# Patient Record
Sex: MALE | Race: WHITE
[De-identification: names, ages, dates, MRNs, and addresses within clinical notes are randomized per-mention and may not be internally consistent; named-entity substitution may affect disease eponyms.]

---

## 2017-03-13 ENCOUNTER — HOSPITAL ENCOUNTER (OUTPATIENT)
Dept: HOSPITAL 23 - SGUS | Age: 62
Discharge: HOME | End: 2017-03-13
Payer: COMMERCIAL

## 2017-03-13 DIAGNOSIS — E04.2: ICD-10-CM

## 2017-03-13 DIAGNOSIS — E04.1: Primary | ICD-10-CM

## 2017-08-24 PROCEDURE — 71035: CPT | Performed by: RADIOLOGY

## 2018-01-18 ENCOUNTER — OFFICE VISIT CONVERTED (OUTPATIENT)
Dept: FAMILY MEDICINE CLINIC | Facility: CLINIC | Age: 63
End: 2018-01-18
Attending: NURSE PRACTITIONER

## 2018-01-18 ENCOUNTER — CONVERSION ENCOUNTER (OUTPATIENT)
Dept: FAMILY MEDICINE CLINIC | Facility: CLINIC | Age: 63
End: 2018-01-18

## 2018-02-19 ENCOUNTER — OFFICE VISIT CONVERTED (OUTPATIENT)
Dept: FAMILY MEDICINE CLINIC | Facility: CLINIC | Age: 63
End: 2018-02-19
Attending: FAMILY MEDICINE

## 2018-02-19 ENCOUNTER — CONVERSION ENCOUNTER (OUTPATIENT)
Dept: FAMILY MEDICINE CLINIC | Facility: CLINIC | Age: 63
End: 2018-02-19

## 2018-09-06 ENCOUNTER — OFFICE VISIT CONVERTED (OUTPATIENT)
Dept: FAMILY MEDICINE CLINIC | Facility: CLINIC | Age: 63
End: 2018-09-06
Attending: FAMILY MEDICINE

## 2018-09-06 ENCOUNTER — CONVERSION ENCOUNTER (OUTPATIENT)
Dept: FAMILY MEDICINE CLINIC | Facility: CLINIC | Age: 63
End: 2018-09-06

## 2019-04-23 ENCOUNTER — CONVERSION ENCOUNTER (OUTPATIENT)
Dept: FAMILY MEDICINE CLINIC | Facility: CLINIC | Age: 64
End: 2019-04-23

## 2019-04-23 ENCOUNTER — OFFICE VISIT CONVERTED (OUTPATIENT)
Dept: FAMILY MEDICINE CLINIC | Facility: CLINIC | Age: 64
End: 2019-04-23
Attending: FAMILY MEDICINE

## 2019-04-23 ENCOUNTER — HOSPITAL ENCOUNTER (OUTPATIENT)
Dept: FAMILY MEDICINE CLINIC | Facility: CLINIC | Age: 64
Discharge: HOME | End: 2019-04-23

## 2019-04-23 LAB
ALBUMIN SERPL-MCNC: 4.3 G/DL (ref 3.5–5)
ALBUMIN/GLOB SERPL: 1.4 {RATIO} (ref 1.4–2.6)
ALP SERPL-CCNC: 84 U/L (ref 56–155)
ALT SERPL-CCNC: 23 U/L (ref 10–40)
ANION GAP SERPL CALC-SCNC: 16 MMOL/L (ref 8–19)
AST SERPL-CCNC: 24 U/L (ref 15–50)
BASOPHILS # BLD AUTO: 0.09 10*3/UL (ref 0–0.2)
BASOPHILS NFR BLD AUTO: 0.9 % (ref 0–3)
BILIRUB SERPL-MCNC: 0.32 MG/DL (ref 0.2–1.3)
BUN SERPL-MCNC: 12 MG/DL (ref 5–25)
BUN/CREAT SERPL: 13 {RATIO} (ref 6–20)
CALCIUM SERPL-MCNC: 9.3 MG/DL (ref 8.7–10.4)
CHLORIDE SERPL-SCNC: 104 MMOL/L (ref 99–111)
CHOLEST SERPL-MCNC: 139 MG/DL (ref 107–200)
CHOLEST/HDLC SERPL: 4.5 {RATIO} (ref 3–6)
CONV ABS IMM GRAN: 0.04 10*3/UL (ref 0–0.2)
CONV CO2: 24 MMOL/L (ref 22–32)
CONV IMMATURE GRAN: 0.4 % (ref 0–1.8)
CONV TOTAL PROTEIN: 7.4 G/DL (ref 6.3–8.2)
CREAT UR-MCNC: 0.91 MG/DL (ref 0.7–1.2)
DEPRECATED RDW RBC AUTO: 48.2 FL (ref 35.1–43.9)
EOSINOPHIL # BLD AUTO: 0.36 10*3/UL (ref 0–0.7)
EOSINOPHIL # BLD AUTO: 3.8 % (ref 0–7)
ERYTHROCYTE [DISTWIDTH] IN BLOOD BY AUTOMATED COUNT: 13.1 % (ref 11.6–14.4)
GFR SERPLBLD BASED ON 1.73 SQ M-ARVRAT: >60 ML/MIN/{1.73_M2}
GLOBULIN UR ELPH-MCNC: 3.1 G/DL (ref 2–3.5)
GLUCOSE SERPL-MCNC: 93 MG/DL (ref 70–99)
HBA1C MFR BLD: 17 G/DL (ref 14–18)
HCT VFR BLD AUTO: 52.2 % (ref 42–52)
HDLC SERPL-MCNC: 31 MG/DL (ref 40–60)
LDLC SERPL CALC-MCNC: 60 MG/DL (ref 70–100)
LYMPHOCYTES # BLD AUTO: 3.08 10*3/UL (ref 1–5)
MCH RBC QN AUTO: 32.3 PG (ref 27–31)
MCHC RBC AUTO-ENTMCNC: 32.6 G/DL (ref 33–37)
MCV RBC AUTO: 99.1 FL (ref 80–96)
MONOCYTES # BLD AUTO: 0.88 10*3/UL (ref 0.2–1.2)
MONOCYTES NFR BLD AUTO: 9.3 % (ref 3–10)
NEUTROPHILS # BLD AUTO: 5.03 10*3/UL (ref 2–8)
NEUTROPHILS NFR BLD AUTO: 53.1 % (ref 30–85)
NRBC CBCN: 0 % (ref 0–0.7)
OSMOLALITY SERPL CALC.SUM OF ELEC: 289 MOSM/KG (ref 273–304)
PLATELET # BLD AUTO: 309 10*3/UL (ref 130–400)
PMV BLD AUTO: 10.4 FL (ref 9.4–12.4)
POTASSIUM SERPL-SCNC: 4.2 MMOL/L (ref 3.5–5.3)
RBC # BLD AUTO: 5.27 10*6/UL (ref 4.7–6.1)
SODIUM SERPL-SCNC: 140 MMOL/L (ref 135–147)
TRIGL SERPL-MCNC: 241 MG/DL (ref 40–150)
VARIANT LYMPHS NFR BLD MANUAL: 32.5 % (ref 20–45)
VLDLC SERPL-MCNC: 48 MG/DL (ref 5–37)
WBC # BLD AUTO: 9.48 10*3/UL (ref 4.8–10.8)

## 2019-06-19 ENCOUNTER — HOSPITAL ENCOUNTER (OUTPATIENT)
Dept: OTHER | Facility: HOSPITAL | Age: 64
Discharge: HOME | End: 2019-06-19

## 2019-06-19 LAB — TSH SERPL-ACNC: 0.6 M[IU]/L (ref 0.27–4.2)

## 2019-12-31 ENCOUNTER — OFFICE VISIT CONVERTED (OUTPATIENT)
Dept: FAMILY MEDICINE CLINIC | Facility: CLINIC | Age: 64
End: 2019-12-31
Attending: FAMILY MEDICINE

## 2019-12-31 ENCOUNTER — CONVERSION ENCOUNTER (OUTPATIENT)
Dept: FAMILY MEDICINE CLINIC | Facility: CLINIC | Age: 64
End: 2019-12-31

## 2020-01-02 ENCOUNTER — HOSPITAL ENCOUNTER (OUTPATIENT)
Dept: OTHER | Facility: HOSPITAL | Age: 65
Discharge: HOME | End: 2020-01-02
Attending: FAMILY MEDICINE

## 2020-01-02 LAB
ALBUMIN SERPL-MCNC: 4.4 G/DL (ref 3.5–5)
ALBUMIN/GLOB SERPL: 1.5 {RATIO} (ref 1.4–2.6)
ALP SERPL-CCNC: 85 U/L (ref 56–155)
ALT SERPL-CCNC: 24 U/L (ref 10–40)
ANION GAP SERPL CALC-SCNC: 23 MMOL/L (ref 8–19)
AST SERPL-CCNC: 24 U/L (ref 15–50)
BASOPHILS # BLD AUTO: 0.11 10*3/UL (ref 0–0.2)
BASOPHILS NFR BLD AUTO: 1 % (ref 0–3)
BILIRUB SERPL-MCNC: 0.46 MG/DL (ref 0.2–1.3)
BUN SERPL-MCNC: 16 MG/DL (ref 5–25)
BUN/CREAT SERPL: 16 {RATIO} (ref 6–20)
CALCIUM SERPL-MCNC: 10 MG/DL (ref 8.7–10.4)
CHLORIDE SERPL-SCNC: 101 MMOL/L (ref 99–111)
CHOLEST SERPL-MCNC: 142 MG/DL (ref 107–200)
CHOLEST/HDLC SERPL: 4.9 {RATIO} (ref 3–6)
CONV ABS IMM GRAN: 0.05 10*3/UL (ref 0–0.2)
CONV CO2: 20 MMOL/L (ref 22–32)
CONV IMMATURE GRAN: 0.4 % (ref 0–1.8)
CONV TOTAL PROTEIN: 7.4 G/DL (ref 6.3–8.2)
CREAT UR-MCNC: 1.03 MG/DL (ref 0.7–1.2)
DEPRECATED RDW RBC AUTO: 46.5 FL (ref 35.1–43.9)
EOSINOPHIL # BLD AUTO: 0.37 10*3/UL (ref 0–0.7)
EOSINOPHIL # BLD AUTO: 3.3 % (ref 0–7)
ERYTHROCYTE [DISTWIDTH] IN BLOOD BY AUTOMATED COUNT: 13 % (ref 11.6–14.4)
GFR SERPLBLD BASED ON 1.73 SQ M-ARVRAT: >60 ML/MIN/{1.73_M2}
GLOBULIN UR ELPH-MCNC: 3 G/DL (ref 2–3.5)
GLUCOSE SERPL-MCNC: 87 MG/DL (ref 70–99)
HCT VFR BLD AUTO: 51.8 % (ref 42–52)
HDLC SERPL-MCNC: 29 MG/DL (ref 40–60)
HGB BLD-MCNC: 17.4 G/DL (ref 14–18)
LDLC SERPL CALC-MCNC: 69 MG/DL (ref 70–100)
LYMPHOCYTES # BLD AUTO: 3.23 10*3/UL (ref 1–5)
LYMPHOCYTES NFR BLD AUTO: 28.4 % (ref 20–45)
MCH RBC QN AUTO: 32.9 PG (ref 27–31)
MCHC RBC AUTO-ENTMCNC: 33.6 G/DL (ref 33–37)
MCV RBC AUTO: 97.9 FL (ref 80–96)
MONOCYTES # BLD AUTO: 1.19 10*3/UL (ref 0.2–1.2)
MONOCYTES NFR BLD AUTO: 10.5 % (ref 3–10)
NEUTROPHILS # BLD AUTO: 6.41 10*3/UL (ref 2–8)
NEUTROPHILS NFR BLD AUTO: 56.4 % (ref 30–85)
NRBC CBCN: 0 % (ref 0–0.7)
OSMOLALITY SERPL CALC.SUM OF ELEC: 289 MOSM/KG (ref 273–304)
PLATELET # BLD AUTO: 310 10*3/UL (ref 130–400)
PMV BLD AUTO: 10.4 FL (ref 9.4–12.4)
POTASSIUM SERPL-SCNC: 4.8 MMOL/L (ref 3.5–5.3)
PSA SERPL-MCNC: 1.46 NG/ML (ref 0–4)
RBC # BLD AUTO: 5.29 10*6/UL (ref 4.7–6.1)
SODIUM SERPL-SCNC: 139 MMOL/L (ref 135–147)
T4 FREE SERPL-MCNC: 1.3 NG/DL (ref 0.9–1.8)
TRIGL SERPL-MCNC: 220 MG/DL (ref 40–150)
TSH SERPL-ACNC: 0.82 M[IU]/L (ref 0.27–4.2)
VLDLC SERPL-MCNC: 44 MG/DL (ref 5–37)
WBC # BLD AUTO: 11.36 10*3/UL (ref 4.8–10.8)

## 2020-08-26 ENCOUNTER — HOSPITAL ENCOUNTER (OUTPATIENT)
Dept: FAMILY MEDICINE CLINIC | Facility: CLINIC | Age: 65
Discharge: HOME | End: 2020-08-26
Attending: FAMILY MEDICINE

## 2020-08-26 ENCOUNTER — OFFICE VISIT CONVERTED (OUTPATIENT)
Dept: FAMILY MEDICINE CLINIC | Facility: CLINIC | Age: 65
End: 2020-08-26
Attending: FAMILY MEDICINE

## 2020-08-26 ENCOUNTER — CONVERSION ENCOUNTER (OUTPATIENT)
Dept: FAMILY MEDICINE CLINIC | Facility: CLINIC | Age: 65
End: 2020-08-26

## 2020-08-26 LAB
ALBUMIN SERPL-MCNC: 4.3 G/DL (ref 3.5–5)
ALBUMIN/GLOB SERPL: 1.6 {RATIO} (ref 1.4–2.6)
ALP SERPL-CCNC: 77 U/L (ref 56–155)
ALT SERPL-CCNC: 19 U/L (ref 10–40)
ANION GAP SERPL CALC-SCNC: 17 MMOL/L (ref 8–19)
AST SERPL-CCNC: 21 U/L (ref 15–50)
BASOPHILS # BLD AUTO: 0.07 10*3/UL (ref 0–0.2)
BASOPHILS NFR BLD AUTO: 0.7 % (ref 0–3)
BILIRUB SERPL-MCNC: 0.3 MG/DL (ref 0.2–1.3)
BUN SERPL-MCNC: 14 MG/DL (ref 5–25)
BUN/CREAT SERPL: 15 {RATIO} (ref 6–20)
CALCIUM SERPL-MCNC: 10 MG/DL (ref 8.7–10.4)
CHLORIDE SERPL-SCNC: 105 MMOL/L (ref 99–111)
CHOLEST SERPL-MCNC: 176 MG/DL (ref 107–200)
CHOLEST/HDLC SERPL: 5.2 {RATIO} (ref 3–6)
CONV ABS IMM GRAN: 0.04 10*3/UL (ref 0–0.2)
CONV CO2: 22 MMOL/L (ref 22–32)
CONV IMMATURE GRAN: 0.4 % (ref 0–1.8)
CONV TOTAL PROTEIN: 7 G/DL (ref 6.3–8.2)
CREAT UR-MCNC: 0.92 MG/DL (ref 0.7–1.2)
DEPRECATED RDW RBC AUTO: 48.9 FL (ref 35.1–43.9)
EOSINOPHIL # BLD AUTO: 0.36 10*3/UL (ref 0–0.7)
EOSINOPHIL # BLD AUTO: 3.4 % (ref 0–7)
ERYTHROCYTE [DISTWIDTH] IN BLOOD BY AUTOMATED COUNT: 13.3 % (ref 11.6–14.4)
GFR SERPLBLD BASED ON 1.73 SQ M-ARVRAT: >60 ML/MIN/{1.73_M2}
GLOBULIN UR ELPH-MCNC: 2.7 G/DL (ref 2–3.5)
GLUCOSE SERPL-MCNC: 95 MG/DL (ref 70–99)
HCT VFR BLD AUTO: 51.8 % (ref 42–52)
HDLC SERPL-MCNC: 34 MG/DL (ref 40–60)
HGB BLD-MCNC: 17.2 G/DL (ref 14–18)
LDLC SERPL CALC-MCNC: 98 MG/DL (ref 70–100)
LYMPHOCYTES # BLD AUTO: 3.11 10*3/UL (ref 1–5)
LYMPHOCYTES NFR BLD AUTO: 29.4 % (ref 20–45)
MCH RBC QN AUTO: 32.6 PG (ref 27–31)
MCHC RBC AUTO-ENTMCNC: 33.2 G/DL (ref 33–37)
MCV RBC AUTO: 98.3 FL (ref 80–96)
MONOCYTES # BLD AUTO: 0.99 10*3/UL (ref 0.2–1.2)
MONOCYTES NFR BLD AUTO: 9.4 % (ref 3–10)
NEUTROPHILS # BLD AUTO: 6 10*3/UL (ref 2–8)
NEUTROPHILS NFR BLD AUTO: 56.7 % (ref 30–85)
NRBC CBCN: 0 % (ref 0–0.7)
OSMOLALITY SERPL CALC.SUM OF ELEC: 290 MOSM/KG (ref 273–304)
PLATELET # BLD AUTO: 302 10*3/UL (ref 130–400)
PMV BLD AUTO: 9.9 FL (ref 9.4–12.4)
POTASSIUM SERPL-SCNC: 4.2 MMOL/L (ref 3.5–5.3)
RBC # BLD AUTO: 5.27 10*6/UL (ref 4.7–6.1)
SODIUM SERPL-SCNC: 140 MMOL/L (ref 135–147)
T4 FREE SERPL-MCNC: 1.2 NG/DL (ref 0.9–1.8)
TRIGL SERPL-MCNC: 221 MG/DL (ref 40–150)
TSH SERPL-ACNC: 0.57 M[IU]/L (ref 0.27–4.2)
VLDLC SERPL-MCNC: 44 MG/DL (ref 5–37)
WBC # BLD AUTO: 10.57 10*3/UL (ref 4.8–10.8)

## 2020-09-08 ENCOUNTER — HOSPITAL ENCOUNTER (OUTPATIENT)
Dept: OTHER | Facility: HOSPITAL | Age: 65
Discharge: HOME | End: 2020-09-08
Attending: FAMILY MEDICINE

## 2021-05-07 NOTE — PROGRESS NOTES
Progress Note      Patient Name: Santos Smith   Patient ID: [id number]   Sex: Male   YOB: 1955        Visit Date: 2019    Provider: Jeffrey Mcmullen MD   Location: [facility]   Location Address: [address] [place] [address]   [place]  [address]   Location Phone: [phone]          Chief Complaint     med refill  fasting labs, he has [facility].       History Of Present Illness  Santos Smith is a 64 year old /White male who presents for evaluation and treatment of:      need refills on meds  hyperlipidemia- unknown control- need labs to assess  arthritis pain controlled on med  pt tolerating meds well       Past Medical History  Disease Name Date Onset Notes   Acid reflux --  --    Allergic rhinitis, seasonal --  --    Anemia --  --    Hemorrhoids --  --    Hernia --  --    Thyroid disease --  --          Past Surgical History  Procedure Name Date Notes   Colonoscopy --  --          Medication List  Name Date Started Instructions   Aspir-81 81 mg oral tablet,delayed release (DR/EC)  take 1 tablet (81 mg) by oral route once daily   celecoxib 200 mg oral capsule 2019 take 1 capsule (200 mg) by oral route once daily for 30 days   Nexium 20 mg oral capsule,delayed release(DR/EC)  take 1 capsule (20 mg) by oral route once daily at least 1 hour before a meal swallowing whole. Do not crush or chew granules.   simvastatin 20 mg oral tablet 2019 take 1 tablet (20 mg) by oral route once daily in the evening for 30 days   Stool Softener 100 mg oral capsule  take 1 capsule (100 mg) by oral route once daily         Allergy List  Allergen Name Date Reaction Notes   Steroids --  --  --          Family Medical History  Disease Name Relative/Age Notes   Cancer, Unspecified Father/    at 78 brother or sister not specified- at age 23         Social History  Finding Status Start/Stop Quantity Notes   Alcohol Current - status unknown --/--  "--  --    lives with spouse --  --/-- --  --    Tobacco Current every day --/-- 1.5 ppd down to <1pk daily 12/31/19         Immunizations  NameDate Admin Mfg Trade Name Lot Number Route Inj VIS Given VIS Publication   Crjezgiwa07/15/2019 UNK Unknown TradeName [id number] NE NE 12/31/2019    Comments: [facility]   Hwkzcgefg99/30/2017 UNK Unknown TradeName [id number] NE NE 02/19/2018 08/07/2015   Comments:    Fciawqdrh0982/23/2017 MSD PNEUMOVAX 23 [id number] NE NE 02/19/2018 04/24/2015   Comments:          Review of Systems  · Constitutional  o Denies  o : fatigue, fever  · Cardiovascular  o Denies  o : chest pain, palpitations  · Respiratory  o Denies  o : shortness of breath, cough  · Gastrointestinal  o Denies  o : nausea, vomiting, diarrhea  · Psychiatric  o Denies  o : anxiety, depression      Vitals  Date Time BP Position Site L\R Cuff Size HR RR TEMP (F) WT  HT  BMI kg/m2 BSA m2 O2 Sat        12/31/2019 08:09 /76 Sitting    73 - R  97.5 212lbs 4oz 5'  8\" 32.27 2.15 93 %          Physical Examination  · Constitutional  o Appearance  o : well developed, well-nourished, in no acute distress  · Respiratory  o Respiratory Effort  o : breathing unlabored  o Auscultation of Lungs  o : clear to ascultation  · Cardiovascular  o Heart  o :   § Auscultation of Heart  § : regular rate and rhythm  o Peripheral Vascular System  o :   § Extremities  § : no edema  · Gastrointestinal  o Abdomen  o : soft, non-tender, non-distended, + bowel sounds, no hepatosplenomegaly, no masses palpated  · Musculoskeletal  o General  o :   § General Musculoskeletal  § : No joint swelling or deformity., Muscle tone, strength, and development grossly normal.  · Neurologic  o Gait and Station  o :   § Gait Screening  § : normal gait  · Psychiatric  o Mood and Affect  o : mood normal, affect appropriate          Assessment  · Hyperlipidemia     272.4/E78.5  · Hypothyroidism     244.9/E03.9  · Screening PSA (prostate specific " antigen)     V76.44/Z12.5      Plan  · Orders  o CBC with Auto Diff [facility] ([id number]) - V76.44/Z12.5, 244.9/E03.9, 272.4/E78.5 - 12/31/2019  o CMP [facility] ([id number]) - V76.44/Z12.5, 244.9/E03.9, 272.4/E78.5 - 12/31/2019  o Lipid Panel [facility] ([id number]) - V76.44/Z12.5, 244.9/E03.9, 272.4/E78.5 - 12/31/2019  o Thyroid Profile (THYII) - V76.44/Z12.5, 244.9/E03.9, 272.4/E78.5 - 12/31/2019  o ACO-14: Influenza immunization administered or previously received () - - 12/31/2019  o ACO-15: Pneumococcal Vaccine Administered or Previously Received (4040F) - - 12/31/2019  o ACO-39: Current medications updated and reviewed () - - 12/31/2019  o PSA Ultrasensitive, ANNUAL SCREENING [facility] ([id number]) - V76.44/Z12.5, 244.9/E03.9, 272.4/E78.5 - 12/31/2019  · Medications  o celecoxib 200 mg oral capsule   SIG: take 1 capsule (200 mg) by oral route once daily for 90 days   DISP: (90) capsule with 1 refills  Adjusted on 12/31/2019     o simvastatin 20 mg oral tablet   SIG: take 1 tablet (20 mg) by oral route once daily in the evening for 90 days   DISP: (90) tablet with 1 refills  Adjusted on 12/31/2019     o Medications have been Reconciled  o Transition of Care or Provider Policy  · Instructions  o Advised that cheeses and other sources of dairy fats, animal fats, fast food, and the extras (candy, pasteries, pies, doughnuts and cookies) all contain LDL raising nutrients. Advised to increase fruits, vegetables, whole grains, and to monitor portion sizes.   o Patient was educated/instructed on their diagnosis, treatment and medications prior to discharge from the clinic today.            Electronically Signed by: Jeffrey Mcmullen MD -Author on December 31, 2019 08:27:21 AM

## 2021-05-07 NOTE — PROGRESS NOTES
Progress Note      Patient Name: Santos Smith   Patient ID: [id number]   Sex: Male   YOB: 1955        Visit Date: 2020    Provider: Jeffrey Mcmullen MD   Location: [facility]   Location Address: [address] [place] [address] [place]  [address]   Location Phone: [phone]          Chief Complaint     med refills  fasting this morning       History Of Present Illness  Santos Smith is a 65 year old /White male who presents for evaluation and treatment of:      pt still smokes 1ppd- pt unable to tolerate patches- pt will wean down- pt urged to quit  pt has been smoking 40+ yrs 1ppd  needs refills and labs  hyperlipidemia- unknown control- need labs to assess- pt watching diet  hypothyroidism- unknown control- need labs to assess- pt not having any fatigue       Past Medical History  Disease Name Date Onset Notes   Acid reflux --  --    Allergic rhinitis, seasonal --  --    Anemia --  --    Hemorrhoids --  --    Hernia --  --    Thyroid disease --  --          Past Surgical History  Procedure Name Date Notes   Colonoscopy --  --          Medication List  Name Date Started Instructions   Aspir-81 81 mg oral tablet,delayed release (/EC)  take 1 tablet (81 mg) by oral route once daily   celecoxib 200 mg oral capsule 2020 take 1 capsule (200 mg) by oral route once daily for 30 days   simvastatin 20 mg oral tablet 2020 take 1 tablet (20 mg) by oral route once daily in the evening for 30 days         Allergy List  Allergen Name Date Reaction Notes   Steroids --  --  --          Family Medical History  Disease Name Relative/Age Notes   Cancer, Unspecified Father/    at 78 brother or sister not specified- at age 23         Social History  Finding Status Start/Stop Quantity Notes   Alcohol Current - status unknown --/-- --  --    lives with spouse --  --/-- --  --    Tobacco Current every day --/-- 1.5 ppd down to <1pk daily 19  "        Immunizations  NameDate Admin Mfg Trade Name Lot Number Route Inj VIS Given VIS Publication   Nwhaezgtd16/15/2019 UNK Unknown TradeName [id number] NE NE 12/31/2019    Comments: [facility]   Dzpcvnwxe42/30/2017 UNK Unknown TradeName [id number] NE NE 02/19/2018 08/07/2015   Comments:    Tbxzsobtx8882/23/2017 MSD PNEUMOVAX 23 [id number] NE NE 02/19/2018 04/24/2015   Comments:          Review of Systems  · Constitutional  o Denies  o : fatigue, fever  · Cardiovascular  o Denies  o : chest pain, palpitations  · Respiratory  o Denies  o : shortness of breath, cough  · Gastrointestinal  o Denies  o : nausea, vomiting, diarrhea  · Psychiatric  o Denies  o : anxiety, depression      Vitals  Date Time BP Position Site L\R Cuff Size HR RR TEMP (F) WT  HT  BMI kg/m2 BSA m2 O2 Sat        08/26/2020 08:05 /76 Sitting    75 - R  97.8 205lbs 4oz 5'  8\" 31.21 2.11 96 %          Physical Examination  · Constitutional  o Appearance  o : well developed, well-nourished, in no acute distress  · Respiratory  o Respiratory Effort  o : breathing unlabored  o Auscultation of Lungs  o : clear to ascultation  · Cardiovascular  o Heart  o :   § Auscultation of Heart  § : regular rate and rhythm  o Peripheral Vascular System  o :   § Extremities  § : no edema  · Gastrointestinal  o Abdomen  o : soft, non-tender, non-distended, + bowel sounds, no hepatosplenomegaly, no masses palpated  · Musculoskeletal  o General  o :   § General Musculoskeletal  § : No joint swelling or deformity., Muscle tone, strength, and development grossly normal.  · Neurologic  o Gait and Station  o :   § Gait Screening  § : normal gait  · Psychiatric  o Mood and Affect  o : mood normal, affect appropriate          Assessment  · Hyperlipidemia     272.4/E78.5  · Hypothyroidism     244.9/E03.9  · Tobacco abuse     305.1/Z72.0      Plan  · Orders  o CBC with Auto Diff [facility] ([id number]) - 272.4/E78.5, 244.9/E03.9 - 08/26/2020  o CMP [facility] ([id number]) - 272.4/E78.5, 244.9/E03.9 - " 08/26/2020  o Lipid Panel [facility] ([id number]) - 272.4/E78.5, 244.9/E03.9 - 08/26/2020  o Thyroid Profile (THYII) - 272.4/E78.5, 244.9/E03.9 - 08/26/2020  o ACO-39: Current medications updated and reviewed () - - 08/26/2020  o Low dose CT scan (LDCT) for lung cancer screening [facility] () - 305.1/Z72.0 - 08/26/2020   pt smoking 1ppd x 40+yrs  · Medications  o celecoxib 200 mg oral capsule   SIG: take 1 capsule (200 mg) by oral route once daily for 30 days   DISP: (90) Capsule with 1 refills  Adjusted on 08/26/2020     o simvastatin 20 mg oral tablet   SIG: take 1 tablet (20 mg) by oral route once daily in the evening for 30 days   DISP: (90) Tablet with 1 refills  Adjusted on 08/26/2020     o Medications have been Reconciled  o Transition of Care or Provider Policy  · Instructions  o Advised that cheeses and other sources of dairy fats, animal fats, fast food, and the extras (candy, pasteries, pies, doughnuts and cookies) all contain LDL raising nutrients. Advised to increase fruits, vegetables, whole grains, and to monitor portion sizes.   o Patient was educated/instructed on their diagnosis, treatment and medications prior to discharge from the clinic today.            Electronically Signed by: Jeffrey Mcmullen MD -Author on August 26, 2020 08:25:37 AM

## 2021-05-07 NOTE — PROGRESS NOTES
Progress Note      Patient Name: Santos Smith   Patient ID: [id number]   Sex: Male   YOB: 1955        Visit Date: 2018    Provider: Jeffrey Mcmullen MD   Location: [facility] [place]   Location Address: [address] [place] [address]  [id number]   Location Phone: [phone]          Chief Complaint     here for medication refills only.       History Of Present Illness  Santos Smith is a 63 year old /White male who presents for evaluation and treatment of:      need refills on meds  hyperlipidemia- unknown control  arthritis pain controlled on med  counseled pt on diet and exercise- urged pt to quit smoking- counseled for 20 minutes       Past Medical History  Disease Name Date Onset Notes   Acid reflux --  --    Allergic rhinitis, seasonal --  --    Anemia --  --    Hemorrhoids --  --    Hernia --  --    Thyroid disease --  --          Past Surgical History  Procedure Name Date Notes   Colonoscopy --  --          Medication List  Name Date Started Instructions   Aspir-81 81 mg oral tablet,delayed release (DR/EC)  take 1 tablet (81 mg) by oral route once daily   celecoxib 200 mg oral capsule 2018 take 1 capsule (200 mg) by oral route once daily for 90 days   Nexium 20 mg oral capsule,delayed release(DR/EC)  take 1 capsule (20 mg) by oral route once daily at least 1 hour before a meal swallowing whole. Do not crush or chew granules.   simvastatin 20 mg oral tablet 2018 take 1 tablet (20 mg) by oral route once daily in the evening for 90 days   Stool Softener 100 mg oral capsule  take 1 capsule (100 mg) by oral route once daily         Allergy List  Allergen Name Date Reaction Notes   Steroids --  --  --          Family Medical History  Disease Name Relative/Age Notes   Cancer, Unspecified /  at 78 brother or sister not specified- at age 23    Father/          Social History  Finding Status Start/Stop Quantity Notes   Alcohol  Current - status unknown --/-- --  --    lives with spouse --  --/-- --  --    Tobacco Current every day --/-- 1.5 ppd --          Immunizations  NameDate Admin Mfg Trade Name Lot Number Route Inj VIS Given VIS Publication   Sffsvqbzl11/30/2017 UNK Unknown TradeName [id number] NE NE 02/19/2018 08/07/2015   Comments:    Gsabryfcz2391/23/2017 MSD Pneumovax 23 [id number] [facility] 02/19/2018 04/24/2015   Comments:          Review of Systems  · Constitutional  o Denies  o : fatigue, fever  · Cardiovascular  o Denies  o : chest pain, palpitations  · Respiratory  o Denies  o : shortness of breath, cough  · Gastrointestinal  o Denies  o : nausea, vomiting, diarrhea  · Psychiatric  o Denies  o : anxiety, depression      Vitals  Date Time BP Position Site L\R Cuff Size HR RR TEMP(F) WT  HT  BMI kg/m2 BSA m2 O2 Sat HC       09/06/2018 01:06 /74 Sitting    67 - R  97 194lbs 2oz    98 %           Physical Examination  · Constitutional  o Appearance  o : well developed, well-nourished, in no acute distress  · Respiratory  o Respiratory Effort  o : breathing unlabored  o Auscultation of Lungs  o : clear to ascultation  · Cardiovascular  o Heart  o :   § Auscultation of Heart  § : regular rate and rhythm  o Peripheral Vascular System  o :   § Extremities  § : no edema  · Gastrointestinal  o Abdomen  o : soft, non-tender, non-distended, + bowel sounds, no hepatosplenomegaly, no masses palpated  · Musculoskeletal  o General  o :   § General Musculoskeletal  § : No joint swelling or deformity., Muscle tone, strength, and development grossly normal.  · Neurologic  o Gait and Station  o :   § Gait Screening  § : normal gait  · Psychiatric  o Mood and Affect  o : mood normal, affect appropriate          Assessment  · Hyperlipidemia     272.4/E78.5  · Arthritis     716.90/M19.90      Plan  · Orders  o CBC with Auto Diff [facility] ([id number]) - 716.90/M19.90, 272.4/E78.5 - 09/06/2018  o CMP [facility] ([id number]) - 716.90/M19.90, 272.4/E78.5 - 09/06/2018  o Lipid  Panel [facility] ([id number]) - 716.90/M19.90, 272.4/E78.5 - 09/06/2018  o ACO-39: Current medications updated and reviewed () - - 09/06/2018  · Medications  o celecoxib 200 mg oral capsule   SIG: take 1 capsule (200 mg) by oral route once daily for 90 days   DISP: (90) capsule with 1 refills  Adjusted on 09/06/2018     o simvastatin 20 mg oral tablet   SIG: take 1 tablet (20 mg) by oral route once daily in the evening for 90 days   DISP: (90) tablet with 1 refills  Adjusted on 09/06/2018     · Instructions  o Advised that cheeses and other sources of dairy fats, animal fats, fast food, and the extras (candy, pasteries, pies, doughnuts and cookies) all contain LDL raising nutrients. Advised to increase fruits, vegetables, whole grains, and to monitor portion sizes.   o Handouts were given to patient: diet.  o Take all medications as prescribed/directed.  o Patient instructed/educated on their diet and exercise program.  o Patient was educated/instructed on their diagnosis, treatment and medications prior to discharge from the clinic today.  o Patient counseled to stop smoking.            Electronically Signed by: Jeffrey cMmullen MD -Author on September 6, 2018 02:08:58 PM

## 2021-05-07 NOTE — PROGRESS NOTES
Progress Note      Patient Name: Santos Smith   Patient ID: [id number]   Sex: Male   YOB: 1955        Visit Date: April 23, 2019    Provider: Lindsay Bradford MD   Location: [facility]   Location Address: [address] [place] [address]  [id number]   Location Phone: [phone]          Chief Complaint     here for medication refills and labs.  also has right ear pain.       History Of Present Illness  Santos Smith is a 63 year old /White male who presents for evaluation and treatment of:      He is here for labs and refills.  He is seeing Dr. Nicole who is following thyroid nodules.  He is followed by Dr. Polk for prostate enlargement.  He had bloodwork and CT's.  He had the check of his bladder with a scope. He has a growth on the ureter and they are following it.  This  started with hematuria.  He uses Celebrex for his foot and sometimes his hands. .  Xrays have always been negative.  He hurts on top of the foot medially.  He had trouble after he had a sprain of that foot.  He has had an elevated WBC for many years.       Past Medical History  Disease Name Date Onset Notes   Acid reflux --  --    Allergic rhinitis, seasonal --  --    Anemia --  --    Hemorrhoids --  --    Hernia --  --    Thyroid disease --  --          Past Surgical History  Procedure Name Date Notes   Colonoscopy --  --          Medication List  Name Date Started Instructions   Aspir-81 81 mg oral tablet,delayed release (DR/EC)  take 1 tablet (81 mg) by oral route once daily   celecoxib 200 mg oral capsule 09/06/2018 take 1 capsule (200 mg) by oral route once daily for 90 days   Nexium 20 mg oral capsule,delayed release(DR/EC)  take 1 capsule (20 mg) by oral route once daily at least 1 hour before a meal swallowing whole. Do not crush or chew granules.   simvastatin 20 mg oral tablet 09/06/2018 take 1 tablet (20 mg) by oral route once daily in the evening for 90 days   Stool  Softener 100 mg oral capsule  take 1 capsule (100 mg) by oral route once daily         Allergy List  Allergen Name Date Reaction Notes   Steroids --  --  --          Family Medical History  Disease Name Relative/Age Notes   Cancer, Unspecified Father/    at 78 brother or sister not specified- at age 23         Social History  Finding Status Start/Stop Quantity Notes   Alcohol Current - status unknown --/-- --  --    lives with spouse --  --/-- --  --    Tobacco Current every day --/-- 1.5 ppd --          Immunizations  NameDate Admin Mfg Trade Name Lot Number Route Inj VIS Given VIS Publication   Rlachoedy41/ UNK Unknown TradeName [id number] NE NE 2018   Comments:    Takmqtcbr3923/23/2017 MSD PNEUMOVAX 23 [id number] NE NE 2018   Comments:          Vitals  Date Time BP Position Site L\R Cuff Size HR RR TEMP (F) WT  HT  BMI kg/m2 BSA m2 O2 Sat        2019 08:00 /82 Sitting    67 - R  97.4 210lbs 0oz    95 %          Physical Examination  · Constitutional  o Appearance  o : well developed, well-nourished, in no acute distress  · Head and Face  o HEENT  o : TM is pinker than normal and there are fluid levels. The R>L  · Eyes  o Conjunctivae  o : conjunctivae normal  · Neck  o Inspection/Palpation  o : supple  o Thyroid  o : no thyromegaly  · Respiratory  o Respiratory Effort  o : breathing unlabored  o Auscultation of Lungs  o : clear to ascultation  · Cardiovascular  o Heart  o :   § Auscultation of Heart  § : regular rate and rhythm  o Peripheral Vascular System  o :   § Extremities  § : no edema  · Lymphatic  o Neck  o : no lymphadenopathy present  · Musculoskeletal  o General  o :   § General Musculoskeletal  § : , Muscle tone, strength, and development grossly normal.  · Skin and Subcutaneous Tissue  o General Inspection  o : normal  · Neurologic  o Gait and Station  o :   § Gait Screening  § : normal gait  · Psychiatric  o Mood and Affect  o : mood  normal, affect appropriate          Assessment  · Hyperlipidemia     272.4/E78.5  · Elevated WBC count     288.60/D72.829      Plan  · Orders  o CBC with Auto Diff [facility] ([id number]) - 288.60/D72.829 - 04/23/2019  o CMP [facility] ([id number]) - 272.4/E78.5 - 04/23/2019  o Lipid Panel [facility] ([id number]) - 272.4/E78.5 - 04/23/2019  o ACO-39: Current medications updated and reviewed () - - 04/23/2019  · Medications  o azithromycin 250 mg oral tablet   SIG: take 2 tablets (500 mg) by oral route once daily for 1 day then 1 tablet (250 mg) by oral route once daily for 4 days   DISP: (6) tablets with 0 refills  Prescribed on 04/23/2019     o celecoxib 200 mg oral capsule   SIG: take 1 capsule (200 mg) by oral route once daily for 90 days   DISP: (90) capsule with 1 refills  Adjusted on 04/23/2019     o simvastatin 20 mg oral tablet   SIG: take 1 tablet (20 mg) by oral route once daily in the evening for 90 days   DISP: (90) tablet with 1 refills  Adjusted on 04/23/2019     · Instructions  o Advised that cheeses and other sources of dairy fats, animal fats, fast food, and the extras (candy, pasteries, pies, doughnuts and cookies) all contain LDL raising nutrients. Advised to increase fruits, vegetables, whole grains, and to monitor portion sizes.   o Patient was educated/instructed on their diagnosis, treatment and medications prior to discharge from the clinic today.            Electronically Signed by: Lindsay Bradford MD -Author on April 23, 2019 08:40:53 AM

## 2021-05-07 NOTE — PROGRESS NOTES
Progress Note      Patient Name: Santos Smith   Patient ID: [id number]   Sex: Male   YOB: 1955        Visit Date: 2018    Provider: Jeffrey Mcmullen MD   Location: [facility]   Location Address: [address] [place] [address]  [id number]   Location Phone: [phone]          Chief Complaint     med refill       History Of Present Illness  Santos Smith is a 62 year old /White male who presents for evaluation and treatment of:      need refills on meds  hyperlipidemia- unknown control  arthritis controlled on med  hematuria- pt will be seeing urology   adrenal adenoma- pt seeing urology and knows about this       Past Medical History  Disease Name Date Onset Notes   Acid reflux --  --    Allergic rhinitis, seasonal --  --    Anemia --  --    Hemorrhoids --  --    Hernia --  --    Thyroid disease --  --          Past Surgical History  Procedure Name Date Notes   Colonoscopy --  --          Medication List  Name Date Started Instructions   Aspir-81 81 mg oral tablet,delayed release (DR/EC)  take 1 tablet (81 mg) by oral route once daily   celecoxib 200 mg oral capsule  take 1 capsule (200 mg) by oral route once daily   Nexium 20 mg oral capsule,delayed release(DR/EC)  take 1 capsule (20 mg) by oral route once daily at least 1 hour before a meal swallowing whole. Do not crush or chew granules.   simvastatin 20 mg oral tablet  take 1 tablet (20 mg) by oral route once daily in the evening   Stool Softener 100 mg oral capsule  take 1 capsule (100 mg) by oral route once daily         Allergy List  Allergen Name Date Reaction Notes   Steroids --  --  --          Family Medical History  Disease Name Relative/Age Notes   Cancer, Unspecified /  at 78 brother or sister not specified- at age 23    Father/          Social History  Finding Status Start/Stop Quantity Notes   Alcohol Current - status unknown --/-- --  --    lives with spouse --   "--/-- --  --    Tobacco Current every day --/-- 1.5 ppd --          Review of Systems  · Constitutional  o Denies  o : fatigue, fever  · Cardiovascular  o Denies  o : chest pain, palpitations  · Respiratory  o Denies  o : shortness of breath, cough  · Gastrointestinal  o Denies  o : nausea, vomiting  · Musculoskeletal  o Denies  o : joint pain      Vitals  Date Time BP Position Site L\R Cuff Size HR RR TEMP(F) WT  HT  BMI kg/m2 BSA m2 O2 Sat        2018 11:01 /72 Sitting    77 - R  97.6 197lbs 2oz 5'  8\" 29.97 2.07 98 %           Physical Examination  · Constitutional  o Appearance  o : well developed, well-nourished, in no acute distress  · Respiratory  o Respiratory Effort  o : breathing unlabored  o Auscultation of Lungs  o : clear to ascultation  · Cardiovascular  o Heart  o :   § Auscultation of Heart  § : regular rate and rhythm  o Peripheral Vascular System  o :   § Extremities  § : no edema  · Gastrointestinal  o Abdominal Examination  o :   § Abdomen  § : active bowel sounds, no hepatosplenomegaly, nontender, no masses palpated  · Musculoskeletal  o General  o :   § General Musculoskeletal  § : No joint swelling or deformity., Muscle tone, strength, and development grossly normal.  · Neurologic  o Gait and Station  o :   § Gait Screening  § : normal gait  · Psychiatric  o Mood and Affect  o : mood normal, affect appropriate          Assessment  · Hyperlipidemia     272.4/E78.5  · Hematuria     599.70/R31.9  · Screening PSA (prostate specific antigen)     V76.44/Z12.5      Plan  · Orders  o CBC with Auto Diff [facility] ([id number]) - 272.4/E78.5, 599.70/R31.9, V76.44/Z12.5 - 2018  o CMP [facility] ([id number]) - 272.4/E78.5, 599.70/R31.9, V76.44/Z12.5 - 2018  o Lipid Panel [facility] ([id number]) - 272.4/E78.5, 599.70/R31.9, V76.44/Z12.5 - 2018  o IOP - Urinalysis without Microscopy (Clinitek) [facility] ([id number]) - 272.4/E78.5, 599.70/R31.9, V76.44/Z12.5 - 2018   Glu:negative Marck:negative Ket:negative S.015 " Blo:negative Ph:7.0 Uro:0.2 Nit:negative Mil:negative  o ACO-39: Current medications updated and reviewed () - - 02/19/2018  o ACO-15: Pneumococcal Vaccine Administered or Previously Received (4040F) - - 02/19/2018  o Influenza immunization was not ordered or administered for reasons documented by clinician () - - 02/19/2018  o PSA Screening, Ultrasensitive, [facility] () - V76.44/Z12.5 - 02/19/2018  · Medications  o celecoxib 200 mg oral capsule   SIG: take 1 capsule (200 mg) by oral route once daily for 90 days   DISP: (90) capsule with 1 refills  Prescribed on 02/19/2018     o simvastatin 20 mg oral tablet   SIG: take 1 tablet (20 mg) by oral route once daily in the evening for 90 days   DISP: (90) tablet with 1 refills  Prescribed on 02/19/2018     · Instructions  o Advised that cheeses and other sources of dairy fats, animal fats, fast food, and the extras (candy, pasteries, pies, doughnuts and cookies) all contain LDL raising nutrients. Advised to increase fruits, vegetables, whole grains, and to monitor portion sizes.   o Patient was educated/instructed on their diagnosis, treatment and medications prior to discharge from the clinic today.            Electronically Signed by: Jeffrey Mcmullen MD -Author on February 19, 2018 11:52:30 AM

## 2021-05-07 NOTE — PROGRESS NOTES
"   Progress Note      Patient Name: Santos Smith   Patient ID: [id number]   Sex: Male   YOB: 1955        Visit Date: January 18, 2018    Provider: DURGA Glez   Location: [facility]   Location Address: [address] [place] [address]  [id number]   Location Phone: [phone]          Chief Complaint     PATIENT IS HERE WITH FIGHTING SINUS PROBLEMS FOR 3 WEEKS BUT IN THE LAST 2 DAYS HIS WHOLE BODY IS HURTING NOW.       History Of Present Illness  Santos Smith is a 62 year old /White male who presents for evaluation and treatment of:      sinus drainage/congestion. He has had low grade fever off and on, not consistent. He has had sneezing and coughing. Then yesterday he developed pain in his lower back and body aches. He has had a dark color to his urine. He does have some \"leakage\" at times, and about 3 weeks ago he noticed some blood in his underwear.       Past Medical History  Disease Name Date Onset Notes   Acid reflux --  --    Allergic rhinitis, seasonal --  --    Anemia --  --    Hemorrhoids --  --    Hernia --  --    Thyroid disease --  --          Past Surgical History  Procedure Name Date Notes   Colonoscopy --  --          Medication List  Name Date Started Instructions   Aspir-81 81 mg oral tablet,delayed release (DR/EC)  take 1 tablet (81 mg) by oral route once daily   celecoxib 200 mg oral capsule  take 1 capsule (200 mg) by oral route once daily   Nexium 20 mg oral capsule,delayed release(DR/EC)  take 1 capsule (20 mg) by oral route once daily at least 1 hour before a meal swallowing whole. Do not crush or chew granules.   simvastatin 20 mg oral tablet  take 1 tablet (20 mg) by oral route once daily in the evening   Stool Softener 100 mg oral capsule  take 1 capsule (100 mg) by oral route once daily         Allergy List  Allergen Name Date Reaction Notes   Steroids --  --  --          Family Medical History  Disease Name Relative/Age Notes "   Cancer, Unspecified /  at 78 brother or sister not specified- at age 23    Father/          Social History  Finding Status Start/Stop Quantity Notes   Alcohol Current - status unknown --/-- --  --    lives with spouse --  --/-- --  --    Tobacco Current every day --/-- 1.5 ppd --          Review of Systems  · Constitutional  o Admits  o : fatigue, fever, chills, body aches  · Eyes  o Denies  o : discharge from eye  · HENT  o Admits  o : sinus pain, nasal congestion, nasal discharge, sore throat, ear pain, sneezing  o Denies  o : headaches, vertigo, lightheadedness, tinnitus, ear fullness  · Cardiovascular  o Denies  o : chest pain, irregular heart beats, syncope, dyspnea on exertion, lower extremity edema  · Respiratory  o Admits  o : cough  o Denies  o : shortness of breath, wheezing  · Gastrointestinal  o Denies  o : nausea, vomiting, diarrhea, abdominal pain  · Genitourinary  o Admits  o : hematuria, change in urine color  o Denies  o : urgency, frequency, dysuria, urinary retention  · Musculoskeletal  o Admits  o : back pain      Vitals  Date Time BP Position Site L\R Cuff Size HR RR TEMP(F) WT  HT  BMI kg/m2 BSA m2 O2 Sat HC       2018 03:09 /78 Sitting    74 - R  98.9 196lbs 4oz    97 %           Physical Examination  · Constitutional  o Appearance  o : well developed, well-nourished, in no acute distress  · Eyes  o Conjunctivae  o : conjunctivae normal, no exudates present  o Pupils and Irises  o : pupils equal and round, pupils reactive to light bilaterally  · Ears, Nose, Mouth and Throat  o Ears  o :   § External Ears  § : auricle appearance normal bilaterally, no auricle tenderness to palpation present, external auditory canal appearance within normal limits  § Otoscopic Examination  § : tympanic membrane appearance within normal limits bilaterally  § Hearing  § : response to sound normal, no tinnitus  o Nose  o :   § External Nose  § : appearance normal  § Intranasal  Exam  § : mucosa within normal limits, sinuses non tender to percussion  o Oral Cavity  o :   § Oral Mucosa  § : oral mucosa normal  § Lips  § : lip appearance normal  § Teeth  § : normal dentition for age  § Gums  § : gums pink, non-swollen, no bleeding present  § Tongue  § : tongue appearance normal  § Palate  § : hard palate normal, soft palate appearance normal  o Throat  o :   § Oropharynx  § : no inflammation or lesions present, tonsils within normal limits  · Neck  o Inspection/Palpation  o : supple  o Thyroid  o : no thyromegaly  · Respiratory  o Respiratory Effort  o : breathing unlabored  o Auscultation of Lungs  o : clear to ascultation  · Cardiovascular  o Heart  o :   § Auscultation of Heart  § : regular rate and rhythm  o Peripheral Vascular System  o :   § Extremities  § : no edema  · Gastrointestinal  o Abdominal Examination  o :   § Abdomen  § : soft, non-tender to palpation, bowel sounds +; mild CVA tenderness bilaterally  · Lymphatic  o Neck  o : no lymphadenopathy present  · Musculoskeletal  o General  o :   § General Musculoskeletal  § : No joint swelling or deformity. Muscle tone, strength, and development grossly normal.  · Skin and Subcutaneous Tissue  o General Inspection  o : no lesions present, no areas of discoloration, skin turgor normal, texture normal  · Neurologic  o Gait and Station  o :   § Gait Screening  § : normal gait  · Psychiatric  o Mood and Affect  o : mood normal, affect appropriate              Assessment  · Upper respiratory infection     465.9/J06.9  · Dark urine     791.9/R82.99  · Hematuria     599.70/R31.9  · Body aches     780.96/R52  · Influenza vaccination up to date     V49.89/Z78.9  · Lower back pain     724.2/M54.5      Plan  · Orders  o IOP - Urinalysis without Microscopy (Clinitek) [facility] ([id number]) - 791.9/R82.99, 599.70/R31.9 - 01/18/2018   GLU NEG, VITALIY NEG, KET NEG, SG 1.015, BLO TRACE, PH 5.5, PRO NEG, URO O.2, NIT NEG, LUIGI NEG  o Urine culture (19336, 69579) -  791.9/R82.99, 599.70/R31.9 - 01/18/2018  o ACO-39: Current medications updated and reviewed () - - 01/18/2018  o Influenza immunization was not ordered or administered for reasons documented by clinician () - V49.89/Z78.9 - 01/18/2018  o IOP - Influenza A/B Test (47716) - 780.96/R52 - 01/18/2018   Negative Flu A/B  o CT Renal Stone Protocol (CT Abdomen and Pelvis without IV Contrast) H; no Oral Prep (77625) - 599.70/R31.9, 724.2/M54.5 - 01/18/2018  · Medications  o cephalexin 500 mg oral capsule   SIG: take 1 capsule (500 mg) by oral route 4 times per day for 10 days   DISP: (40) capsules with 0 refills  Prescribed on 01/18/2018     · Instructions  o Take all medications as prescribed/directed.  o Rest. Increase Fluids.  o Patient was educated/instructed on their diagnosis, treatment and medications prior to discharge from the clinic today. His UA showed trace-lysed blood. He does have urinary symptoms, and did see blood 3 weeks ago. He is a smoker. I will send the urine for culture, but will also order CT stone protocol d/t c/o lower back pain and mild CVA tenderness. Cephalexin for the URI/sinus symptoms, and to cover for possible UTI. Will call him the results of the culture and CT when resulted.   · Disposition  o Call or Return if symptoms worsen or persist.            Electronically Signed by: DURGA Glez -Author on January 18, 2018 04:12:24 PM

## 2021-05-09 VITALS
TEMPERATURE: 97 F | HEART RATE: 67 BPM | SYSTOLIC BLOOD PRESSURE: 118 MMHG | OXYGEN SATURATION: 98 % | DIASTOLIC BLOOD PRESSURE: 74 MMHG | WEIGHT: 194.12 LBS

## 2021-05-09 VITALS
HEART RATE: 73 BPM | OXYGEN SATURATION: 93 % | WEIGHT: 212.25 LBS | HEIGHT: 68 IN | TEMPERATURE: 97.5 F | DIASTOLIC BLOOD PRESSURE: 76 MMHG | BODY MASS INDEX: 32.17 KG/M2 | SYSTOLIC BLOOD PRESSURE: 124 MMHG

## 2021-05-09 VITALS
TEMPERATURE: 97.6 F | HEIGHT: 68 IN | OXYGEN SATURATION: 98 % | SYSTOLIC BLOOD PRESSURE: 118 MMHG | HEART RATE: 77 BPM | DIASTOLIC BLOOD PRESSURE: 72 MMHG | BODY MASS INDEX: 29.87 KG/M2 | WEIGHT: 197.12 LBS

## 2021-05-09 VITALS
WEIGHT: 196.25 LBS | DIASTOLIC BLOOD PRESSURE: 78 MMHG | TEMPERATURE: 98.9 F | SYSTOLIC BLOOD PRESSURE: 128 MMHG | HEART RATE: 74 BPM | OXYGEN SATURATION: 97 %

## 2021-05-09 VITALS
HEIGHT: 68 IN | HEART RATE: 75 BPM | OXYGEN SATURATION: 96 % | BODY MASS INDEX: 31.11 KG/M2 | DIASTOLIC BLOOD PRESSURE: 76 MMHG | TEMPERATURE: 97.8 F | SYSTOLIC BLOOD PRESSURE: 126 MMHG | WEIGHT: 205.25 LBS

## 2021-05-09 VITALS
SYSTOLIC BLOOD PRESSURE: 130 MMHG | WEIGHT: 210 LBS | DIASTOLIC BLOOD PRESSURE: 82 MMHG | OXYGEN SATURATION: 95 % | TEMPERATURE: 97.4 F | HEART RATE: 67 BPM

## 2021-08-19 ENCOUNTER — OFFICE VISIT (OUTPATIENT)
Dept: FAMILY MEDICINE CLINIC | Facility: CLINIC | Age: 66
End: 2021-08-19

## 2021-08-19 VITALS
OXYGEN SATURATION: 95 % | HEIGHT: 68 IN | WEIGHT: 202.6 LBS | SYSTOLIC BLOOD PRESSURE: 122 MMHG | BODY MASS INDEX: 30.71 KG/M2 | HEART RATE: 59 BPM | TEMPERATURE: 97.1 F | DIASTOLIC BLOOD PRESSURE: 90 MMHG

## 2021-08-19 DIAGNOSIS — Z87.891 PERSONAL HISTORY OF NICOTINE DEPENDENCE: ICD-10-CM

## 2021-08-19 DIAGNOSIS — E78.2 MIXED HYPERLIPIDEMIA: Primary | ICD-10-CM

## 2021-08-19 DIAGNOSIS — Z72.0 TOBACCO ABUSE: ICD-10-CM

## 2021-08-19 DIAGNOSIS — Z79.899 DRUG THERAPY: ICD-10-CM

## 2021-08-19 DIAGNOSIS — M19.90 ARTHRITIS: ICD-10-CM

## 2021-08-19 PROCEDURE — 85025 COMPLETE CBC W/AUTO DIFF WBC: CPT | Performed by: FAMILY MEDICINE

## 2021-08-19 PROCEDURE — 36415 COLL VENOUS BLD VENIPUNCTURE: CPT | Performed by: FAMILY MEDICINE

## 2021-08-19 PROCEDURE — 80053 COMPREHEN METABOLIC PANEL: CPT | Performed by: FAMILY MEDICINE

## 2021-08-19 PROCEDURE — 99214 OFFICE O/P EST MOD 30 MIN: CPT | Performed by: FAMILY MEDICINE

## 2021-08-19 PROCEDURE — 80061 LIPID PANEL: CPT | Performed by: FAMILY MEDICINE

## 2021-08-19 RX ORDER — CELECOXIB 200 MG/1
200 CAPSULE ORAL DAILY
Qty: 90 CAPSULE | Refills: 1 | Status: SHIPPED | OUTPATIENT
Start: 2021-08-19 | End: 2022-03-17 | Stop reason: SDUPTHER

## 2021-08-19 RX ORDER — ASPIRIN 81 MG/1
TABLET ORAL
COMMUNITY

## 2021-08-19 RX ORDER — SIMVASTATIN 20 MG
20 TABLET ORAL NIGHTLY
COMMUNITY
End: 2021-08-19 | Stop reason: SDUPTHER

## 2021-08-19 RX ORDER — PSEUDOEPHEDRINE HCL 30 MG
100 TABLET ORAL AS NEEDED
COMMUNITY
End: 2021-08-19

## 2021-08-19 RX ORDER — CELECOXIB 200 MG/1
200 CAPSULE ORAL DAILY
COMMUNITY
End: 2021-08-19 | Stop reason: SDUPTHER

## 2021-08-19 RX ORDER — SIMVASTATIN 20 MG
20 TABLET ORAL NIGHTLY
Qty: 90 TABLET | Refills: 1 | Status: SHIPPED | OUTPATIENT
Start: 2021-08-19 | End: 2022-03-17 | Stop reason: SDUPTHER

## 2021-08-19 NOTE — PROGRESS NOTES
Venipuncture Blood Specimen Collection  Venipuncture performed in left arm by Monica Lozano with good hemostasis. Patient tolerated the procedure well without complications.   08/19/21   Monica Lozano

## 2021-08-19 NOTE — PROGRESS NOTES
"Chief Complaint  Hyperlipidemia (med refills, fasting labs) and Arthritis    Subjective          Santos Smith presents to [facility]  Pt urged to quit smoking- pt smokes 1.5ppd x 40 yrs- pt wants to wean down- pt refuses any tx at this time- pt has ahd multiple trials with different txs    Hyperlipidemia  This is a chronic problem. The current episode started more than 1 year ago. The problem is uncontrolled. Recent lipid tests were reviewed and are variable. There are no known factors aggravating his hyperlipidemia. Pertinent negatives include no chest pain, focal sensory loss, focal weakness, leg pain, myalgias or shortness of breath. Current antihyperlipidemic treatment includes statins. The current treatment provides significant improvement of lipids. There are no compliance problems.  Risk factors for coronary artery disease include dyslipidemia, male sex and family history.   Arthritis  Presents for follow-up visit. He reports no pain, stiffness, joint swelling or joint warmth. The symptoms have been stable. Affected locations include the left foot and right foot (bilateral hands). Pertinent negatives include no diarrhea, dry eyes, dry mouth, dysuria, fatigue, fever, pain at night, pain while resting, rash, Raynaud's syndrome, uveitis or weight loss. Compliance with total regimen is %. Compliance with medications is %. Treatment side effects: no side effects.       Objective   Allergies   Allergen Reactions   • Prednisone Arrhythmia     Immunization History   Administered Date(s) Administered   • Influenza, Unspecified 10/15/2019   • Pneumococcal Polysaccharide (PPSV23) 08/23/2017       Vital Signs:   Vitals:    08/19/21 1050   BP: 122/90   Pulse: 59   Temp: 97.1 °F (36.2 °C)   SpO2: 95%   Weight: 91.9 kg (202 lb 9.6 oz)   Height: 172.7 cm (68\")       Physical Exam  Vitals reviewed.   Constitutional:       Appearance: Normal appearance. He is well-developed. "   HENT:      Head: Normocephalic and atraumatic.      Right Ear: External ear normal.      Left Ear: External ear normal.      Mouth/Throat:      Pharynx: No oropharyngeal exudate.   Eyes:      Conjunctiva/sclera: Conjunctivae normal.      Pupils: Pupils are equal, round, and reactive to light.   Cardiovascular:      Rate and Rhythm: Normal rate and regular rhythm.      Pulses: Normal pulses.      Heart sounds: Normal heart sounds. No murmur heard.   No friction rub. No gallop.    Pulmonary:      Effort: Pulmonary effort is normal.      Breath sounds: Normal breath sounds. No wheezing or rhonchi.   Abdominal:      General: Bowel sounds are normal. There is no distension.      Palpations: Abdomen is soft.      Tenderness: There is no abdominal tenderness.   Skin:     General: Skin is warm and dry.   Neurological:      Mental Status: He is alert and oriented to person, place, and time.      Cranial Nerves: No cranial nerve deficit.   Psychiatric:         Mood and Affect: Mood and affect normal.         Behavior: Behavior normal.         Thought Content: Thought content normal.         Judgment: Judgment normal.        Result Review :   The following data was reviewed by: Jeffrey Mcmullen MD on 08/19/2021:  CMP    CMP 8/26/20   Glucose 95   BUN 14   Creatinine 0.92   Sodium 140   Potassium 4.2   Chloride 105   Calcium 10.0   Albumin 4.3   Total Bilirubin 0.30   Alkaline Phosphatase 77   AST (SGOT) 21   ALT (SGPT) 19           CBC    CBC 8/26/20   WBC 10.57   RBC 5.27   Hemoglobin 17.2   Hematocrit 51.8   MCV 98.3 (A)   MCH 32.6 (A)   MCHC 33.2   RDW 13.3   Platelets 302   (A) Abnormal value            Lipid Panel    Lipid Panel 8/26/20   Total Cholesterol 176   Triglycerides 221 (A)   HDL Cholesterol 34 (A)   VLDL Cholesterol 44 (A)   LDL Cholesterol  98   (A) Abnormal value       Comments are available for some flowsheets but are not being displayed.                     Assessment and Plan    Diagnoses and all orders  for this visit:    1. Mixed hyperlipidemia (Primary)  -     simvastatin (ZOCOR) 20 MG tablet; Take 1 tablet by mouth Every Night.  Dispense: 90 tablet; Refill: 1  -     Comprehensive Metabolic Panel  -     Lipid Panel    2. Arthritis  -     celecoxib (CeleBREX) 200 MG capsule; Take 1 capsule by mouth Daily.  Dispense: 90 capsule; Refill: 1  -     CBC Auto Differential  -     Comprehensive Metabolic Panel    3. Tobacco abuse  -      CT Chest Low Dose Cancer Screening WO; Future    4. Personal history of nicotine dependence   -      CT Chest Low Dose Cancer Screening WO; Future    5. Drug therapy  -     CBC Auto Differential            Follow Up   Return in about 6 months (around 2/19/2022) for Recheck.  Patient was given instructions and counseling regarding his condition or for health maintenance advice. Please see specific information pulled into the AVS if appropriate.

## 2021-08-20 LAB
ALBUMIN SERPL-MCNC: 4.3 G/DL (ref 3.5–5.2)
ALBUMIN/GLOB SERPL: 2 G/DL
ALP SERPL-CCNC: 65 U/L (ref 39–117)
ALT SERPL W P-5'-P-CCNC: 18 U/L (ref 1–41)
ANION GAP SERPL CALCULATED.3IONS-SCNC: -1.3 MMOL/L (ref 5–15)
AST SERPL-CCNC: 24 U/L (ref 1–40)
BASOPHILS # BLD AUTO: 0.1 10*3/MM3 (ref 0–0.2)
BASOPHILS NFR BLD AUTO: 1.2 % (ref 0–1.5)
BILIRUB SERPL-MCNC: 0.3 MG/DL (ref 0–1.2)
BUN SERPL-MCNC: 7 MG/DL (ref 8–23)
BUN/CREAT SERPL: 9.6 (ref 7–25)
CALCIUM SPEC-SCNC: 7.4 MG/DL (ref 8.6–10.5)
CHLORIDE SERPL-SCNC: 106 MMOL/L (ref 98–107)
CHOLEST SERPL-MCNC: 109 MG/DL (ref 0–200)
CO2 SERPL-SCNC: 34.3 MMOL/L (ref 22–29)
CREAT SERPL-MCNC: 0.73 MG/DL (ref 0.76–1.27)
DEPRECATED RDW RBC AUTO: 44.5 FL (ref 37–54)
EOSINOPHIL # BLD AUTO: 0.2 10*3/MM3 (ref 0–0.4)
EOSINOPHIL NFR BLD AUTO: 2.5 % (ref 0.3–6.2)
ERYTHROCYTE [DISTWIDTH] IN BLOOD BY AUTOMATED COUNT: 12.4 % (ref 12.3–15.4)
GFR SERPL CREATININE-BSD FRML MDRD: 108 ML/MIN/1.73
GLOBULIN UR ELPH-MCNC: 2.2 GM/DL
GLUCOSE SERPL-MCNC: 124 MG/DL (ref 65–99)
HCT VFR BLD AUTO: 49.3 % (ref 37.5–51)
HDLC SERPL-MCNC: 58 MG/DL (ref 40–60)
HGB BLD-MCNC: 17 G/DL (ref 13–17.7)
IMM GRANULOCYTES # BLD AUTO: 0.02 10*3/MM3 (ref 0–0.05)
IMM GRANULOCYTES NFR BLD AUTO: 0.2 % (ref 0–0.5)
LDLC SERPL CALC-MCNC: 29 MG/DL (ref 0–100)
LDLC/HDLC SERPL: 0.43 {RATIO}
LYMPHOCYTES # BLD AUTO: 2.77 10*3/MM3 (ref 0.7–3.1)
LYMPHOCYTES NFR BLD AUTO: 34.2 % (ref 19.6–45.3)
MCH RBC QN AUTO: 33 PG (ref 26.6–33)
MCHC RBC AUTO-ENTMCNC: 34.5 G/DL (ref 31.5–35.7)
MCV RBC AUTO: 95.7 FL (ref 79–97)
MONOCYTES # BLD AUTO: 0.81 10*3/MM3 (ref 0.1–0.9)
MONOCYTES NFR BLD AUTO: 10 % (ref 5–12)
NEUTROPHILS NFR BLD AUTO: 4.21 10*3/MM3 (ref 1.7–7)
NEUTROPHILS NFR BLD AUTO: 51.9 % (ref 42.7–76)
NRBC BLD AUTO-RTO: 0 /100 WBC (ref 0–0.2)
PLATELET # BLD AUTO: 311 10*3/MM3 (ref 140–450)
PMV BLD AUTO: 10.2 FL (ref 6–12)
POTASSIUM SERPL-SCNC: 4.4 MMOL/L (ref 3.5–5.2)
PROT SERPL-MCNC: 6.5 G/DL (ref 6–8.5)
RBC # BLD AUTO: 5.15 10*6/MM3 (ref 4.14–5.8)
SODIUM SERPL-SCNC: 139 MMOL/L (ref 136–145)
TRIGL SERPL-MCNC: 129 MG/DL (ref 0–150)
VLDLC SERPL-MCNC: 22 MG/DL (ref 5–40)
WBC # BLD AUTO: 8.11 10*3/MM3 (ref 3.4–10.8)

## 2021-08-23 NOTE — PROGRESS NOTES
Call pt:  Labs show no significant abnormalities except for low calcium levels.  Take oral calcium and follow-up in 1-2 weeks to recheck calcium.  Follow-up in office if you have any questions.

## 2021-08-27 ENCOUNTER — HOSPITAL ENCOUNTER (OUTPATIENT)
Dept: CT IMAGING | Facility: HOSPITAL | Age: 66
Discharge: HOME | End: 2021-08-27
Admitting: FAMILY MEDICINE

## 2021-08-27 DIAGNOSIS — Z72.0 TOBACCO ABUSE: Primary | ICD-10-CM

## 2021-08-27 DIAGNOSIS — Z87.891 PERSONAL HISTORY OF NICOTINE DEPENDENCE: ICD-10-CM

## 2021-08-27 DIAGNOSIS — Z72.0 TOBACCO ABUSE: ICD-10-CM

## 2021-08-27 PROCEDURE — 71271 CT THORAX LUNG CANCER SCR C-: CPT

## 2021-08-27 NOTE — PROGRESS NOTES
Call pt: CT chest shows benign appearing small stable nodules in left lung.  Radiology recommends repeat chest CT in 1 yr.

## 2021-09-13 DIAGNOSIS — E83.51 HYPOCALCEMIA: Primary | ICD-10-CM

## 2021-09-13 LAB
ALBUMIN SERPL-MCNC: 4.4 G/DL (ref 3.5–5.2)
ALBUMIN/GLOB SERPL: 1.6 G/DL
ALP SERPL-CCNC: 81 U/L (ref 39–117)
ALT SERPL W P-5'-P-CCNC: 19 U/L (ref 1–41)
ANION GAP SERPL CALCULATED.3IONS-SCNC: 10.3 MMOL/L (ref 5–15)
AST SERPL-CCNC: 22 U/L (ref 1–40)
BILIRUB SERPL-MCNC: 0.2 MG/DL (ref 0–1.2)
BUN SERPL-MCNC: 12 MG/DL (ref 8–23)
BUN/CREAT SERPL: 14.6 (ref 7–25)
CALCIUM SPEC-SCNC: 9.9 MG/DL (ref 8.6–10.5)
CHLORIDE SERPL-SCNC: 106 MMOL/L (ref 98–107)
CO2 SERPL-SCNC: 24.7 MMOL/L (ref 22–29)
CREAT SERPL-MCNC: 0.82 MG/DL (ref 0.76–1.27)
GFR SERPL CREATININE-BSD FRML MDRD: 94 ML/MIN/1.73
GLOBULIN UR ELPH-MCNC: 2.8 GM/DL
GLUCOSE SERPL-MCNC: 78 MG/DL (ref 65–99)
POTASSIUM SERPL-SCNC: 4.2 MMOL/L (ref 3.5–5.2)
PROT SERPL-MCNC: 7.2 G/DL (ref 6–8.5)
SODIUM SERPL-SCNC: 141 MMOL/L (ref 136–145)

## 2021-09-13 PROCEDURE — 80053 COMPREHEN METABOLIC PANEL: CPT | Performed by: FAMILY MEDICINE

## 2021-10-26 ENCOUNTER — CLINICAL SUPPORT (OUTPATIENT)
Dept: FAMILY MEDICINE CLINIC | Facility: CLINIC | Age: 66
End: 2021-10-26

## 2021-10-26 ENCOUNTER — TELEPHONE (OUTPATIENT)
Dept: FAMILY MEDICINE CLINIC | Facility: CLINIC | Age: 66
End: 2021-10-26

## 2021-10-26 DIAGNOSIS — E83.51 HYPOCALCEMIA: Primary | ICD-10-CM

## 2021-10-26 DIAGNOSIS — E78.2 MIXED HYPERLIPIDEMIA: ICD-10-CM

## 2021-10-26 LAB
ALBUMIN SERPL-MCNC: 4.3 G/DL (ref 3.5–5.2)
ALBUMIN/GLOB SERPL: 1.5 G/DL
ALP SERPL-CCNC: 75 U/L (ref 39–117)
ALT SERPL W P-5'-P-CCNC: 19 U/L (ref 1–41)
ANION GAP SERPL CALCULATED.3IONS-SCNC: 8.9 MMOL/L (ref 5–15)
AST SERPL-CCNC: 19 U/L (ref 1–40)
BILIRUB SERPL-MCNC: 0.2 MG/DL (ref 0–1.2)
BUN SERPL-MCNC: 10 MG/DL (ref 8–23)
BUN/CREAT SERPL: 10.9 (ref 7–25)
CALCIUM SPEC-SCNC: 9.8 MG/DL (ref 8.6–10.5)
CHLORIDE SERPL-SCNC: 106 MMOL/L (ref 98–107)
CO2 SERPL-SCNC: 27.1 MMOL/L (ref 22–29)
CREAT SERPL-MCNC: 0.92 MG/DL (ref 0.76–1.27)
GFR SERPL CREATININE-BSD FRML MDRD: 83 ML/MIN/1.73
GLOBULIN UR ELPH-MCNC: 2.8 GM/DL
GLUCOSE SERPL-MCNC: 82 MG/DL (ref 65–99)
POTASSIUM SERPL-SCNC: 4 MMOL/L (ref 3.5–5.2)
PROT SERPL-MCNC: 7.1 G/DL (ref 6–8.5)
SODIUM SERPL-SCNC: 142 MMOL/L (ref 136–145)

## 2021-10-26 PROCEDURE — 36415 COLL VENOUS BLD VENIPUNCTURE: CPT | Performed by: FAMILY MEDICINE

## 2021-10-26 PROCEDURE — 80053 COMPREHEN METABOLIC PANEL: CPT | Performed by: FAMILY MEDICINE

## 2021-10-26 NOTE — PROGRESS NOTES
Venipuncture Blood Specimen Collection  Venipuncture performed in left arm by Monica Lozano with good hemostasis. Patient tolerated the procedure well without complications.   10/26/21   Monica Lozano

## 2021-10-26 NOTE — TELEPHONE ENCOUNTER
Caller: Santos Smith    Relationship: Self    Best call back number: [phone]    What orders are you requesting (i.e. lab or imaging): LAB WORK CALCIUM CHECK   In what timeframe would the patient need to come in: ASAP

## 2022-02-13 DIAGNOSIS — M19.90 ARTHRITIS: ICD-10-CM

## 2022-02-13 DIAGNOSIS — E78.2 MIXED HYPERLIPIDEMIA: ICD-10-CM

## 2022-02-14 RX ORDER — CELECOXIB 200 MG/1
200 CAPSULE ORAL DAILY
Qty: 90 CAPSULE | Refills: 1 | OUTPATIENT
Start: 2022-02-14

## 2022-02-14 RX ORDER — SIMVASTATIN 20 MG
20 TABLET ORAL NIGHTLY
Qty: 90 TABLET | Refills: 1 | OUTPATIENT
Start: 2022-02-14

## 2022-03-17 ENCOUNTER — OFFICE VISIT (OUTPATIENT)
Dept: FAMILY MEDICINE CLINIC | Facility: CLINIC | Age: 67
End: 2022-03-17

## 2022-03-17 VITALS
TEMPERATURE: 96.9 F | SYSTOLIC BLOOD PRESSURE: 150 MMHG | WEIGHT: 209 LBS | BODY MASS INDEX: 31.78 KG/M2 | DIASTOLIC BLOOD PRESSURE: 100 MMHG | OXYGEN SATURATION: 98 % | HEART RATE: 80 BPM

## 2022-03-17 DIAGNOSIS — Z12.5 SCREENING PSA (PROSTATE SPECIFIC ANTIGEN): Primary | ICD-10-CM

## 2022-03-17 DIAGNOSIS — I10 PRIMARY HYPERTENSION: ICD-10-CM

## 2022-03-17 DIAGNOSIS — M19.90 ARTHRITIS: ICD-10-CM

## 2022-03-17 DIAGNOSIS — E78.2 MIXED HYPERLIPIDEMIA: ICD-10-CM

## 2022-03-17 PROBLEM — E66.9 OBESITY (BMI 30.0-34.9): Status: ACTIVE | Noted: 2022-03-17

## 2022-03-17 PROBLEM — E66.811 OBESITY (BMI 30.0-34.9): Status: ACTIVE | Noted: 2022-03-17

## 2022-03-17 LAB — PSA SERPL-MCNC: 1.47 NG/ML (ref 0–4)

## 2022-03-17 PROCEDURE — G0103 PSA SCREENING: HCPCS | Performed by: FAMILY MEDICINE

## 2022-03-17 PROCEDURE — 36415 COLL VENOUS BLD VENIPUNCTURE: CPT | Performed by: FAMILY MEDICINE

## 2022-03-17 PROCEDURE — 99214 OFFICE O/P EST MOD 30 MIN: CPT | Performed by: FAMILY MEDICINE

## 2022-03-17 RX ORDER — LISINOPRIL 10 MG/1
10 TABLET ORAL DAILY
Qty: 90 TABLET | Refills: 1 | Status: SHIPPED | OUTPATIENT
Start: 2022-03-17 | End: 2022-03-25 | Stop reason: SDUPTHER

## 2022-03-17 RX ORDER — SIMVASTATIN 20 MG
20 TABLET ORAL NIGHTLY
Qty: 90 TABLET | Refills: 1 | Status: SHIPPED | OUTPATIENT
Start: 2022-03-17 | End: 2022-09-15 | Stop reason: SDUPTHER

## 2022-03-17 RX ORDER — CELECOXIB 200 MG/1
200 CAPSULE ORAL DAILY
Qty: 90 CAPSULE | Refills: 1 | Status: SHIPPED | OUTPATIENT
Start: 2022-03-17 | End: 2022-08-31

## 2022-03-17 NOTE — PROGRESS NOTES
Chief Complaint  Hyperlipidemia (Refill meds )    Subjective          Santos Smith presents to [facility]  Pt urged to quit smoking- pt smoking 1 ppd- pt unable to tolerate patches- discussed R&B of meds- pt refuses meds at this time- he will wean down on own    Hyperlipidemia  This is a chronic problem. The current episode started more than 1 year ago. The problem is controlled. Recent lipid tests were reviewed and are variable. There are no known factors aggravating his hyperlipidemia. Pertinent negatives include no chest pain, focal sensory loss, focal weakness, leg pain, myalgias or shortness of breath. Current antihyperlipidemic treatment includes statins. The current treatment provides significant improvement of lipids. There are no compliance problems.  Risk factors for coronary artery disease include dyslipidemia, hypertension, male sex and family history.   Hypertension  This is a chronic problem. The current episode started more than 1 month ago. The problem has been gradually worsening since onset. The problem is uncontrolled. Pertinent negatives include no anxiety, blurred vision, chest pain, headaches, malaise/fatigue, neck pain, orthopnea, palpitations, peripheral edema, PND, shortness of breath or sweats. There are no associated agents to hypertension. Current antihypertension treatment includes nothing.       Objective   Allergies   Allergen Reactions   • Prednisone Arrhythmia     Immunization History   Administered Date(s) Administered   • Influenza, Unspecified 10/15/2019   • Pneumococcal Polysaccharide (PPSV23) 08/23/2017     Past Medical History:   Diagnosis Date   • Acid reflux    • Allergic rhinitis, seasonal    • Anemia    • Condition not found     HERNIA   • Hemorrhoids    • Thyroid disease       Past Surgical History:   Procedure Laterality Date   • COLONOSCOPY        Social History     Socioeconomic History   • Marital status:    Tobacco Use   •  Smoking status: Current Every Day Smoker     Packs/day: 1.50   • Smokeless tobacco: Never Used   • Tobacco comment: DOWN TO <1PK DAILY 19   Vaping Use   • Vaping Use: Never used   Substance and Sexual Activity   • Alcohol use: Defer     Comment: CURRENT STATUS UNKNOWN    • Drug use: Never        Current Outpatient Medications:   •  aspirin 81 MG EC tablet, Aspir-81 81 mg oral tablet,delayed release (DR/EC) take 1 tablet (81 mg) by oral route once daily   Active, Disp: , Rfl:   •  celecoxib (CeleBREX) 200 MG capsule, Take 1 capsule by mouth Daily., Disp: 90 capsule, Rfl: 1  •  simvastatin (ZOCOR) 20 MG tablet, Take 1 tablet by mouth Every Night., Disp: 90 tablet, Rfl: 1  •  lisinopril (PRINIVIL,ZESTRIL) 10 MG tablet, Take 1 tablet by mouth Daily., Disp: 90 tablet, Rfl: 1   Family History   Problem Relation Age of Onset   • Cancer Father          AT 78; BROTHER OR SISTER NOT SPECIFIED-  AT AGE 23          Vital Signs:   Vitals:    22 1010   BP: 150/100   Pulse: 80   Temp: 96.9 °F (36.1 °C)   SpO2: 98%   Weight: 94.8 kg (209 lb)       Review of Systems   Constitutional: Negative for malaise/fatigue.   Eyes: Negative for blurred vision.   Respiratory: Negative for shortness of breath.    Cardiovascular: Negative for chest pain, palpitations, orthopnea and PND.   Musculoskeletal: Negative for myalgias and neck pain.   Neurological: Negative for focal weakness and headaches.      Physical Exam  Vitals reviewed.   Constitutional:       Appearance: Normal appearance. He is well-developed.   HENT:      Head: Normocephalic and atraumatic.      Right Ear: External ear normal.      Left Ear: External ear normal.      Mouth/Throat:      Pharynx: No oropharyngeal exudate.   Eyes:      Conjunctiva/sclera: Conjunctivae normal.      Pupils: Pupils are equal, round, and reactive to light.   Cardiovascular:      Rate and Rhythm: Normal rate and regular rhythm.      Pulses: Normal pulses.      Heart sounds:  Normal heart sounds. No murmur heard.    No friction rub. No gallop.   Pulmonary:      Effort: Pulmonary effort is normal.      Breath sounds: Normal breath sounds. No wheezing or rhonchi.   Abdominal:      General: Abdomen is flat. Bowel sounds are normal. There is no distension.      Palpations: Abdomen is soft. There is no mass.      Tenderness: There is no abdominal tenderness. There is no guarding or rebound.      Hernia: No hernia is present.   Musculoskeletal:         General: Normal range of motion.      Cervical back: Normal range of motion and neck supple.   Skin:     General: Skin is warm and dry.      Capillary Refill: Capillary refill takes less than 2 seconds.   Neurological:      General: No focal deficit present.      Mental Status: He is alert and oriented to person, place, and time.      Cranial Nerves: No cranial nerve deficit.   Psychiatric:         Mood and Affect: Mood and affect normal.         Behavior: Behavior normal.         Thought Content: Thought content normal.         Judgment: Judgment normal.        Result Review :   The following data was reviewed by: Jeffrey Mcmullen MD on 03/17/2022:  CMP    CMP 8/19/21 9/13/21 10/26/21   Glucose 124 (A) 78 82   BUN 7 (A) 12 10   Creatinine 0.73 (A) 0.82 0.92   eGFR Non African Am 108 94 83   Sodium 139 141 142   Potassium 4.4 4.2 4.0   Chloride 106 106 106   Calcium 7.4 (A) 9.9 9.8   Albumin 4.30 4.40 4.30   Total Bilirubin 0.3 0.2 0.2   Alkaline Phosphatase 65 81 75   AST (SGOT) 24 22 19   ALT (SGPT) 18 19 19   (A) Abnormal value            CBC    CBC 8/19/21   WBC 8.11   RBC 5.15   Hemoglobin 17.0   Hematocrit 49.3   MCV 95.7   MCH 33.0   MCHC 34.5   RDW 12.4   Platelets 311           Lipid Panel    Lipid Panel 8/19/21   Total Cholesterol 109   Triglycerides 129   HDL Cholesterol 58   VLDL Cholesterol 22   LDL Cholesterol  29   LDL/HDL Ratio 0.43                         Assessment and Plan    Diagnoses and all orders for this visit:    1.  Screening PSA (prostate specific antigen) (Primary)  -     PSA Screen    2. Primary hypertension  -     CBC Auto Differential; Future  -     Comprehensive Metabolic Panel; Future  -     Lipid Panel; Future  -     TSH+Free T4; Future  -     lisinopril (PRINIVIL,ZESTRIL) 10 MG tablet; Take 1 tablet by mouth Daily.  Dispense: 90 tablet; Refill: 1    3. Arthritis  -     celecoxib (CeleBREX) 200 MG capsule; Take 1 capsule by mouth Daily.  Dispense: 90 capsule; Refill: 1    4. Mixed hyperlipidemia  -     simvastatin (ZOCOR) 20 MG tablet; Take 1 tablet by mouth Every Night.  Dispense: 90 tablet; Refill: 1            Follow Up   Return in about 1 week (around 3/24/2022) for Medicare Wellness.  Patient was given instructions and counseling regarding his condition or for health maintenance advice. Please see specific information pulled into the AVS if appropriate.       Answers for HPI/ROS submitted by the patient on 3/16/2022  What is the primary reason for your visit?: Other  Please describe your symptoms.: Refills  Have you had these symptoms before?: No  How long have you been having these symptoms?: 1-4 days  Please list any medications you are currently taking for this condition.: Celecoxib 200mg capsules .                    Simbastatin 20mg tablets  Please describe any probable cause for these symptoms. : None

## 2022-03-17 NOTE — PROGRESS NOTES
Venipuncture Blood Specimen Collection  Venipuncture performed in left6 arm by Monica Lozano with good hemostasis. Patient tolerated the procedure well without complications.   03/17/22   Monica Lozano

## 2022-03-25 ENCOUNTER — OFFICE VISIT (OUTPATIENT)
Dept: FAMILY MEDICINE CLINIC | Facility: CLINIC | Age: 67
End: 2022-03-25

## 2022-03-25 VITALS
HEART RATE: 65 BPM | WEIGHT: 207.6 LBS | BODY MASS INDEX: 31.46 KG/M2 | HEIGHT: 68 IN | DIASTOLIC BLOOD PRESSURE: 70 MMHG | OXYGEN SATURATION: 98 % | SYSTOLIC BLOOD PRESSURE: 128 MMHG | TEMPERATURE: 96.4 F

## 2022-03-25 DIAGNOSIS — Z00.00 MEDICARE ANNUAL WELLNESS VISIT, SUBSEQUENT: Primary | ICD-10-CM

## 2022-03-25 DIAGNOSIS — I10 PRIMARY HYPERTENSION: ICD-10-CM

## 2022-03-25 LAB — HCV AB SER DONR QL: NORMAL

## 2022-03-25 PROCEDURE — 86787 VARICELLA-ZOSTER ANTIBODY: CPT | Performed by: FAMILY MEDICINE

## 2022-03-25 PROCEDURE — 1126F AMNT PAIN NOTED NONE PRSNT: CPT | Performed by: FAMILY MEDICINE

## 2022-03-25 PROCEDURE — 96160 PT-FOCUSED HLTH RISK ASSMT: CPT | Performed by: FAMILY MEDICINE

## 2022-03-25 PROCEDURE — 86803 HEPATITIS C AB TEST: CPT | Performed by: FAMILY MEDICINE

## 2022-03-25 PROCEDURE — 1159F MED LIST DOCD IN RCRD: CPT | Performed by: FAMILY MEDICINE

## 2022-03-25 PROCEDURE — 99213 OFFICE O/P EST LOW 20 MIN: CPT | Performed by: FAMILY MEDICINE

## 2022-03-25 PROCEDURE — G0439 PPPS, SUBSEQ VISIT: HCPCS | Performed by: FAMILY MEDICINE

## 2022-03-25 PROCEDURE — 36415 COLL VENOUS BLD VENIPUNCTURE: CPT | Performed by: FAMILY MEDICINE

## 2022-03-25 PROCEDURE — 1170F FXNL STATUS ASSESSED: CPT | Performed by: FAMILY MEDICINE

## 2022-03-25 RX ORDER — LISINOPRIL 5 MG/1
5 TABLET ORAL DAILY
Qty: 90 TABLET | Refills: 1 | Status: SHIPPED | OUTPATIENT
Start: 2022-03-25 | End: 2022-08-31

## 2022-03-25 NOTE — PROGRESS NOTES
"Chief Complaint  Hypertension (Has been having slight headaches since starting the blood pressure medicine.  Had severe lightheadedness yesterday when he would stand from a sitting position, worse in evening.)    Subjective    History of Present Illness    Santos Smith presents for Annual Wellness Visit as well as for follow-up on chronic medical problems including hypertension. Pt has had some dizziness upon standing when on lisinopril.  Will need to decrease dose of lisinopril.  HTN getting better controlled.     Past Medical History:   Diagnosis Date   • Acid reflux    • Allergic rhinitis, seasonal    • Anemia    • Condition not found    • Hemorrhoids    • Thyroid disease      Past Surgical History:   Procedure Laterality Date   • COLONOSCOPY       Family History   Problem Relation Age of Onset   • Cancer Father          AT 78; BROTHER OR SISTER NOT SPECIFIED-  AT AGE 23     Social History     Tobacco Use   • Smoking status: Current Every Day Smoker     Packs/day: 1.50   • Smokeless tobacco: Never Used   • Tobacco comment: DOWN TO <1PK DAILY 19   Substance Use Topics   • Alcohol use: Defer     Comment: CURRENT STATUS UNKNOWN      Vitals:    22 1047   BP: 128/70   BP Location: Left arm   Patient Position: Sitting   Cuff Size: Adult   Pulse: 65   Temp: 96.4 °F (35.8 °C)   TempSrc: Temporal   SpO2: 98%   Weight: 94.2 kg (207 lb 9.6 oz)   Height: 172.7 cm (68\")     Body mass index is 31.57 kg/m².    Result Review :   The following data was reviewed by: Jeffrey Mcmullen MD on 2022:  CMP    CMP 8/19/21 9/13/21 10/26/21   Glucose 124 (A) 78 82   BUN 7 (A) 12 10   Creatinine 0.73 (A) 0.82 0.92   eGFR Non African Am 108 94 83   Sodium 139 141 142   Potassium 4.4 4.2 4.0   Chloride 106 106 106   Calcium 7.4 (A) 9.9 9.8   Albumin 4.30 4.40 4.30   Total Bilirubin 0.3 0.2 0.2   Alkaline Phosphatase 65 81 75   AST (SGOT) 24 22 19   ALT (SGPT) 18 19 19   (A) Abnormal value            CBC  "   CBC 8/19/21   WBC 8.11   RBC 5.15   Hemoglobin 17.0   Hematocrit 49.3   MCV 95.7   MCH 33.0   MCHC 34.5   RDW 12.4   Platelets 311           Lipid Panel    Lipid Panel 8/19/21   Total Cholesterol 109   Triglycerides 129   HDL Cholesterol 58   VLDL Cholesterol 22   LDL Cholesterol  29   LDL/HDL Ratio 0.43               PSA    PSA 3/17/22   PSA 1.470                      Subsequent Medicare Wellness Visit    Chief Complaint   Patient presents with   • Hypertension     Has been having slight headaches since starting the blood pressure medicine.  Had severe lightheadedness yesterday when he would stand from a sitting position, worse in evening.      Subjective    History of Present Illness:  Santos Smith is a 66 y.o. male who presents for a Subsequent Medicare Wellness Visit.    The following portions of the patient's history were reviewed and   updated as appropriate:   He  has a past medical history of Acid reflux, Allergic rhinitis, seasonal, Anemia, Condition not found, Hemorrhoids, and Thyroid disease.  He does not have any pertinent problems on file.  He  has a past surgical history that includes Colonoscopy.  His family history includes Cancer in his father.  He  reports that he has been smoking. He has been smoking about 1.50 packs per day. He has never used smokeless tobacco. Alcohol use questions deferred to the physician. He reports that he does not use drugs.  Current Outpatient Medications   Medication Sig Dispense Refill   • celecoxib (CeleBREX) 200 MG capsule Take 1 capsule by mouth Daily. 90 capsule 1   • lisinopril (PRINIVIL,ZESTRIL) 5 MG tablet Take 1 tablet by mouth Daily. 90 tablet 1   • simvastatin (ZOCOR) 20 MG tablet Take 1 tablet by mouth Every Night. 90 tablet 1   • aspirin 81 MG EC tablet Aspir-81 81 mg oral tablet,delayed release (DR/EC) take 1 tablet (81 mg) by oral route once daily   Active       No current facility-administered medications for this visit.     Current Outpatient  Medications on File Prior to Visit   Medication Sig   • celecoxib (CeleBREX) 200 MG capsule Take 1 capsule by mouth Daily.   • simvastatin (ZOCOR) 20 MG tablet Take 1 tablet by mouth Every Night.   • [DISCONTINUED] lisinopril (PRINIVIL,ZESTRIL) 10 MG tablet Take 1 tablet by mouth Daily.   • aspirin 81 MG EC tablet Aspir-81 81 mg oral tablet,delayed release (DR/EC) take 1 tablet (81 mg) by oral route once daily   Active     No current facility-administered medications on file prior to visit.     He is allergic to prednisone..    Compared to one year ago, the patient feels his physical   health is the same.    Compared to one year ago, the patient feels his mental   health is the same.    Recent Hospitalizations:  He was not admitted to the hospital during the last year.       Current Medical Providers:  Patient Care Team:  Jeffrey Mcmullen MD as PCP - General (Family Medicine)  Ginna Barth APRN as Nurse Practitioner (Family Medicine)  Provider, No Known    Outpatient Medications Prior to Visit   Medication Sig Dispense Refill   • celecoxib (CeleBREX) 200 MG capsule Take 1 capsule by mouth Daily. 90 capsule 1   • simvastatin (ZOCOR) 20 MG tablet Take 1 tablet by mouth Every Night. 90 tablet 1   • lisinopril (PRINIVIL,ZESTRIL) 10 MG tablet Take 1 tablet by mouth Daily. 90 tablet 1   • aspirin 81 MG EC tablet Aspir-81 81 mg oral tablet,delayed release (DR/EC) take 1 tablet (81 mg) by oral route once daily   Active       No facility-administered medications prior to visit.       No opioid medication identified on active medication list. I have reviewed chart for other potential  high risk medication/s and harmful drug interactions in the elderly.          Aspirin is on active medication list. Aspirin use is indicated based on review of current medical condition/s. Pros and cons of this therapy have been discussed today. Benefits of this medication outweigh potential harm.  Patient has been encouraged to  "continue taking this medication.  .      Patient Active Problem List   Diagnosis   • Obesity (BMI 30.0-34.9)     Advance Care Planning  Advance Directive is not on file.  ACP discussion was held with the patient during this visit. Patient does not have an advance directive, information provided.          Objective    Vitals:    03/25/22 1047   BP: 128/70   BP Location: Left arm   Patient Position: Sitting   Cuff Size: Adult   Pulse: 65   Temp: 96.4 °F (35.8 °C)   TempSrc: Temporal   SpO2: 98%   Weight: 94.2 kg (207 lb 9.6 oz)   Height: 172.7 cm (68\")     BMI Readings from Last 1 Encounters:   03/25/22 31.57 kg/m²   BMI is above normal parameters. Recommendations include: exercise counseling and nutrition counseling    Does the patient have evidence of cognitive impairment? No    Physical Exam            HEALTH RISK ASSESSMENT    Smoking Status:  Social History     Tobacco Use   Smoking Status Current Every Day Smoker   • Packs/day: 1.50   Smokeless Tobacco Never Used   Tobacco Comment    DOWN TO <1PK DAILY 12/31/19     Alcohol Consumption:  Social History     Substance and Sexual Activity   Alcohol Use Defer    Comment: CURRENT STATUS UNKNOWN      Fall Risk Screen:    STEADI Fall Risk Assessment was completed, and patient is at LOW risk for falls.Assessment completed on:3/25/2022    Depression Screening:  PHQ-2/PHQ-9 Depression Screening 3/25/2022   Retired PHQ-9 Total Score -   Retired Total Score -   Little Interest or Pleasure in Doing Things 1-->several days   Feeling Down, Depressed or Hopeless 1-->several days   Trouble Falling or Staying Asleep, or Sleeping Too Much 0-->not at all   Feeling Tired or Having Little Energy 0-->not at all   Poor Appetite or Overeating 0-->not at all   Feeling Bad about Yourself - or that You are a Failure or Have Let Yourself or Your Family Down 0-->not at all   Trouble Concentrating on Things, Such as Reading the Newspaper or Watching Television 0-->not at all   Moving or " Speaking So Slowly that Other People Could Have Noticed? Or the Opposite - Being So Fidgety 0-->not at all   Thoughts that You Would be Better Off Dead or of Hurting Yourself in Some Way 0-->not at all   PHQ-9: Brief Depression Severity Measure Score 2   If You Checked Off Any Problems, How Difficult Have These Problems Made It For You to Do Your Work, Take Care of Things at Home, or Get Along with Other People? not difficult at all       Health Habits and Functional and Cognitive Screening:  Functional & Cognitive Status 3/25/2022   Do you have difficulty preparing food and eating? No   Do you have difficulty bathing yourself, getting dressed or grooming yourself? No   Do you have difficulty using the toilet? No   Do you have difficulty moving around from place to place? No   Do you have trouble with steps or getting out of a bed or a chair? No   Current Diet Well Balanced Diet   Dental Exam Not up to date   Eye Exam Up to date   Exercise (times per week) 0 times per week   Current Exercises Include No Regular Exercise   Do you need help using the phone?  No   Are you deaf or do you have serious difficulty hearing?  No   Do you need help with transportation? No   Do you need help shopping? No   Do you need help preparing meals?  No   Do you need help with housework?  No   Do you need help with laundry? No   Do you need help taking your medications? No   Do you need help managing money? No   Do you ever drive or ride in a car without wearing a seat belt? No       Age-appropriate Screening Schedule:  Refer to the list below for future screening recommendations based on patient's age, sex and/or medical conditions. Orders for these recommended tests are listed in the plan section. The patient has been provided with a written plan.    Health Maintenance   Topic Date Due   • ZOSTER VACCINE (1 of 2) Never done   • TDAP/TD VACCINES (1 - Tdap) 03/25/2023 (Originally 6/18/1974)   • LIPID PANEL  08/19/2022   • INFLUENZA  VACCINE  Completed              Assessment/Plan   CMS Preventative Services Quick Reference  Risk Factors Identified During Encounter  Immunizations Discussed/Encouraged (specific Immunizations; Tdap and Shingrix  Obesity/Overweight   The above risks/problems have been discussed with the patient.  Follow up actions/plans if indicated are seen below in the Assessment/Plan Section.  Pertinent information has been shared with the patient in the After Visit Summary.    Diagnoses and all orders for this visit:    1. Medicare annual wellness visit, subsequent (Primary)  -     Hepatitis C Antibody  -     Varicella Zoster Antibody, IgG    2. Primary hypertension  -     lisinopril (PRINIVIL,ZESTRIL) 5 MG tablet; Take 1 tablet by mouth Daily.  Dispense: 90 tablet; Refill: 1        Follow Up:   Return if symptoms worsen or fail to improve.     An After Visit Summary and PPPS were made available to the patient.

## 2022-03-25 NOTE — PROGRESS NOTES
Venipuncture Blood Specimen Collection  Venipuncture performed in left arm by Monica Lozano with good hemostasis. Patient tolerated the procedure well without complications.   03/25/22   Monica Lozano

## 2022-03-26 LAB — VZV IGG SER IA-ACNC: 1508 INDEX

## 2022-03-28 NOTE — PROGRESS NOTES
Your varicella antibodies are positive showing you have been exposed to chicken pox sometime in your life.  You should get the shingles vaccine.

## 2022-08-31 ENCOUNTER — OFFICE VISIT (OUTPATIENT)
Dept: FAMILY MEDICINE CLINIC | Facility: CLINIC | Age: 67
End: 2022-08-31

## 2022-08-31 VITALS
HEART RATE: 75 BPM | WEIGHT: 183 LBS | BODY MASS INDEX: 27.83 KG/M2 | OXYGEN SATURATION: 98 % | SYSTOLIC BLOOD PRESSURE: 122 MMHG | TEMPERATURE: 97.7 F | DIASTOLIC BLOOD PRESSURE: 70 MMHG

## 2022-08-31 DIAGNOSIS — M54.2 CERVICALGIA: Primary | ICD-10-CM

## 2022-08-31 PROCEDURE — 99213 OFFICE O/P EST LOW 20 MIN: CPT | Performed by: FAMILY MEDICINE

## 2022-08-31 RX ORDER — MELOXICAM 15 MG/1
15 TABLET ORAL DAILY
Qty: 14 TABLET | Refills: 0 | Status: SHIPPED | OUTPATIENT
Start: 2022-08-31 | End: 2022-09-15

## 2022-08-31 RX ORDER — LISINOPRIL 10 MG/1
5 TABLET ORAL DAILY
COMMUNITY
Start: 2022-07-09 | End: 2022-09-15 | Stop reason: SDUPTHER

## 2022-08-31 RX ORDER — CYCLOBENZAPRINE HCL 5 MG
5 TABLET ORAL NIGHTLY PRN
Qty: 14 TABLET | Refills: 0 | Status: SHIPPED | OUTPATIENT
Start: 2022-08-31 | End: 2023-03-30

## 2022-08-31 NOTE — PROGRESS NOTES
Chief Complaint    Neck Pain (Neck pain x one month. )    Subjective      Santos Smith presents to [facility]    History of Present Illness    1.) NECK PAIN : Patient presents with complaint of neck pain.  Onset was 1 month ago. No known inciting event. He reports pain located at the right lateral and partially posterior aspect of his neck. He notes that he has been experiencing limited range of motion. He has utilized multiple therapies at home including IcyHot and acetaminophen with no significant relief. He denies any significant upper extremity symptoms during most of this time, but he does admit to slight numbness of his right upper extremity this a.m.  He does note significant pain of his neck during the course of the past month.    Objective     Vital Signs:     /70   Pulse 75   Temp 97.7 °F (36.5 °C)   Wt 83 kg (183 lb)   SpO2 98%   BMI 27.83 kg/m²       Physical Exam  Vitals reviewed.   Constitutional:       General: He is not in acute distress.     Appearance: Normal appearance. He is well-developed.   HENT:      Head: Normocephalic and atraumatic.      Right Ear: Hearing and external ear normal.      Left Ear: Hearing and external ear normal.      Nose: Nose normal.   Eyes:      General: Lids are normal.         Right eye: No discharge.         Left eye: No discharge.      Conjunctiva/sclera: Conjunctivae normal.   Neck:      Comments: Examination of patient's cervical region reveals hypertonicity of his right sternocleidomastoid (SCM) and trapezius muscle.  He is limited with his active and passive range of motion in certain planes.  Tenderness with palpation of certain aspects of his SCM and trapezius.  Pulmonary:      Effort: Pulmonary effort is normal.   Abdominal:      General: There is no distension.   Musculoskeletal:         General: No swelling.   Skin:     Coloration: Skin is not jaundiced.      Findings: No erythema.   Neurological:      Mental  Status: He is alert. Mental status is at baseline.   Psychiatric:         Mood and Affect: Mood and affect normal.         Thought Content: Thought content normal.     Assessment and Plan     Diagnoses and all orders for this visit:    1. Cervicalgia (Primary)  Comments:  Pt was instructed regarding OMM muscle energy. Trial of Mobic and as needed muscle relaxant as noted below. Continue with ice/relative rest.    Other orders  -     meloxicam (Mobic) 15 MG tablet; Take 1 tablet by mouth Daily for 14 days.  Dispense: 14 tablet; Refill: 0  -     cyclobenzaprine (FLEXERIL) 5 MG tablet; Take 1 tablet by mouth At Night As Needed (cervical muscle spasm).  Dispense: 14 tablet; Refill: 0    Follow Up     Return in about 1 week (around 9/7/2022).    Patient was given instructions and counseling regarding his condition or for health maintenance advice. Please see specific information pulled into the AVS if appropriate.

## 2022-09-09 DIAGNOSIS — E78.2 MIXED HYPERLIPIDEMIA: ICD-10-CM

## 2022-09-09 DIAGNOSIS — M19.90 ARTHRITIS: ICD-10-CM

## 2022-09-09 RX ORDER — SIMVASTATIN 20 MG
20 TABLET ORAL NIGHTLY
Qty: 90 TABLET | Refills: 1 | OUTPATIENT
Start: 2022-09-09

## 2022-09-09 RX ORDER — CELECOXIB 200 MG/1
200 CAPSULE ORAL DAILY
Qty: 90 CAPSULE | Refills: 1 | OUTPATIENT
Start: 2022-09-09

## 2022-09-15 ENCOUNTER — OFFICE VISIT (OUTPATIENT)
Dept: FAMILY MEDICINE CLINIC | Facility: CLINIC | Age: 67
End: 2022-09-15

## 2022-09-15 VITALS
SYSTOLIC BLOOD PRESSURE: 105 MMHG | HEART RATE: 50 BPM | BODY MASS INDEX: 28.28 KG/M2 | WEIGHT: 186 LBS | OXYGEN SATURATION: 98 % | TEMPERATURE: 98.2 F | DIASTOLIC BLOOD PRESSURE: 68 MMHG

## 2022-09-15 DIAGNOSIS — M19.90 ARTHRITIS: ICD-10-CM

## 2022-09-15 DIAGNOSIS — I10 PRIMARY HYPERTENSION: ICD-10-CM

## 2022-09-15 DIAGNOSIS — E78.2 MIXED HYPERLIPIDEMIA: Primary | ICD-10-CM

## 2022-09-15 DIAGNOSIS — Z72.0 TOBACCO ABUSE: ICD-10-CM

## 2022-09-15 DIAGNOSIS — Z87.891 PERSONAL HISTORY OF NICOTINE DEPENDENCE: ICD-10-CM

## 2022-09-15 LAB
ALBUMIN SERPL-MCNC: 4.7 G/DL (ref 3.5–5.2)
ALBUMIN/GLOB SERPL: 2 G/DL
ALP SERPL-CCNC: 71 U/L (ref 39–117)
ALT SERPL W P-5'-P-CCNC: 18 U/L (ref 1–41)
ANION GAP SERPL CALCULATED.3IONS-SCNC: 8 MMOL/L (ref 5–15)
AST SERPL-CCNC: 25 U/L (ref 1–40)
BASOPHILS # BLD AUTO: 0.1 10*3/MM3 (ref 0–0.2)
BASOPHILS NFR BLD AUTO: 0.9 % (ref 0–1.5)
BILIRUB SERPL-MCNC: 0.3 MG/DL (ref 0–1.2)
BUN SERPL-MCNC: 15 MG/DL (ref 8–23)
BUN/CREAT SERPL: 19.7 (ref 7–25)
CALCIUM SPEC-SCNC: 9.7 MG/DL (ref 8.6–10.5)
CHLORIDE SERPL-SCNC: 104 MMOL/L (ref 98–107)
CHOLEST SERPL-MCNC: 104 MG/DL (ref 0–200)
CO2 SERPL-SCNC: 27 MMOL/L (ref 22–29)
CREAT SERPL-MCNC: 0.76 MG/DL (ref 0.76–1.27)
DEPRECATED RDW RBC AUTO: 42.8 FL (ref 37–54)
EGFRCR SERPLBLD CKD-EPI 2021: 99.1 ML/MIN/1.73
EOSINOPHIL # BLD AUTO: 0.42 10*3/MM3 (ref 0–0.4)
EOSINOPHIL NFR BLD AUTO: 3.9 % (ref 0.3–6.2)
ERYTHROCYTE [DISTWIDTH] IN BLOOD BY AUTOMATED COUNT: 12.1 % (ref 12.3–15.4)
GLOBULIN UR ELPH-MCNC: 2.3 GM/DL
GLUCOSE SERPL-MCNC: 95 MG/DL (ref 65–99)
HCT VFR BLD AUTO: 49.3 % (ref 37.5–51)
HDLC SERPL-MCNC: 42 MG/DL (ref 40–60)
HGB BLD-MCNC: 16.6 G/DL (ref 13–17.7)
IMM GRANULOCYTES # BLD AUTO: 0.03 10*3/MM3 (ref 0–0.05)
IMM GRANULOCYTES NFR BLD AUTO: 0.3 % (ref 0–0.5)
LDLC SERPL CALC-MCNC: 48 MG/DL (ref 0–100)
LDLC/HDLC SERPL: 1.16 {RATIO}
LYMPHOCYTES # BLD AUTO: 3.1 10*3/MM3 (ref 0.7–3.1)
LYMPHOCYTES NFR BLD AUTO: 28.7 % (ref 19.6–45.3)
MCH RBC QN AUTO: 32.8 PG (ref 26.6–33)
MCHC RBC AUTO-ENTMCNC: 33.7 G/DL (ref 31.5–35.7)
MCV RBC AUTO: 97.4 FL (ref 79–97)
MONOCYTES # BLD AUTO: 0.96 10*3/MM3 (ref 0.1–0.9)
MONOCYTES NFR BLD AUTO: 8.9 % (ref 5–12)
NEUTROPHILS NFR BLD AUTO: 57.3 % (ref 42.7–76)
NEUTROPHILS NFR BLD AUTO: 6.18 10*3/MM3 (ref 1.7–7)
NRBC BLD AUTO-RTO: 0 /100 WBC (ref 0–0.2)
PLATELET # BLD AUTO: 295 10*3/MM3 (ref 140–450)
PMV BLD AUTO: 9.7 FL (ref 6–12)
POTASSIUM SERPL-SCNC: 4.5 MMOL/L (ref 3.5–5.2)
PROT SERPL-MCNC: 7 G/DL (ref 6–8.5)
RBC # BLD AUTO: 5.06 10*6/MM3 (ref 4.14–5.8)
SODIUM SERPL-SCNC: 139 MMOL/L (ref 136–145)
T4 FREE SERPL-MCNC: 1.25 NG/DL (ref 0.93–1.7)
TRIGL SERPL-MCNC: 67 MG/DL (ref 0–150)
TSH SERPL DL<=0.05 MIU/L-ACNC: 0.39 UIU/ML (ref 0.27–4.2)
VLDLC SERPL-MCNC: 14 MG/DL (ref 5–40)
WBC NRBC COR # BLD: 10.79 10*3/MM3 (ref 3.4–10.8)

## 2022-09-15 PROCEDURE — 80053 COMPREHEN METABOLIC PANEL: CPT | Performed by: FAMILY MEDICINE

## 2022-09-15 PROCEDURE — 36415 COLL VENOUS BLD VENIPUNCTURE: CPT | Performed by: FAMILY MEDICINE

## 2022-09-15 PROCEDURE — 84439 ASSAY OF FREE THYROXINE: CPT | Performed by: FAMILY MEDICINE

## 2022-09-15 PROCEDURE — 84443 ASSAY THYROID STIM HORMONE: CPT | Performed by: FAMILY MEDICINE

## 2022-09-15 PROCEDURE — 80061 LIPID PANEL: CPT | Performed by: FAMILY MEDICINE

## 2022-09-15 PROCEDURE — 99214 OFFICE O/P EST MOD 30 MIN: CPT | Performed by: FAMILY MEDICINE

## 2022-09-15 PROCEDURE — 85025 COMPLETE CBC W/AUTO DIFF WBC: CPT | Performed by: FAMILY MEDICINE

## 2022-09-15 RX ORDER — SIMVASTATIN 20 MG
20 TABLET ORAL NIGHTLY
Qty: 90 TABLET | Refills: 1 | Status: SHIPPED | OUTPATIENT
Start: 2022-09-15 | End: 2023-03-30 | Stop reason: SDUPTHER

## 2022-09-15 RX ORDER — CELECOXIB 200 MG/1
200 CAPSULE ORAL DAILY
Qty: 90 CAPSULE | Refills: 1 | Status: SHIPPED | OUTPATIENT
Start: 2022-09-15 | End: 2023-03-30 | Stop reason: SDUPTHER

## 2022-09-15 RX ORDER — LISINOPRIL 5 MG/1
5 TABLET ORAL DAILY
Qty: 90 TABLET | Refills: 1 | Status: SHIPPED | OUTPATIENT
Start: 2022-09-15 | End: 2023-03-30 | Stop reason: SDUPTHER

## 2022-09-15 NOTE — PROGRESS NOTES
Venipuncture Blood Specimen Collection  Venipuncture performed in left arm by Coty Ramírez with good hemostasis. Patient tolerated the procedure well without complications.   09/15/22   Coty Ramírez    Answers for HPI/ROS submitted by the patient on 9/10/2022  What is the primary reason for your visit?: Other  Please describe your symptoms.: None  Have you had these symptoms before?: No  How long have you been having these symptoms?: 1-4 days

## 2022-09-15 NOTE — PROGRESS NOTES
Chief Complaint  Hypertension and Hyperlipidemia (Follow up on blood pressur and hyperlipidemia. Refills and labs  )    Subjective          Santos Smith presents to [facility]  History of Present Illness  Pt urged to quit smoking- weaning down- less than 1ppd  Hyperlipidemia  This is a chronic problem. The current episode started more than 1 year ago. The problem is controlled. Recent lipid tests were reviewed and are variable. There are no known factors aggravating his hyperlipidemia. Pertinent negatives include no chest pain, focal sensory loss, focal weakness, leg pain, myalgias or shortness of breath. Current antihyperlipidemic treatment includes statins. The current treatment provides significant improvement of lipids. There are no compliance problems.  Risk factors for coronary artery disease include dyslipidemia, hypertension, male sex and family history.   Hypertension  This is a chronic problem. The current episode started more than 1 year ago. The problem has been gradually improving since onset. The problem is controlled. Pertinent negatives include no anxiety, blurred vision, chest pain, headaches, malaise/fatigue, neck pain, orthopnea, palpitations, peripheral edema, PND, shortness of breath or sweats. There are no associated agents to hypertension. Risk factors for coronary artery disease include dyslipidemia, family history, male gender and smoking/tobacco exposure. Current antihypertension treatment includes ACE inhibitors. The current treatment provides significant improvement.       Objective   Allergies   Allergen Reactions   • Prednisone Arrhythmia     Immunization History   Administered Date(s) Administered   • COVID-19 (MODERNA) 1st, 2nd, 3rd Dose Only 03/10/2021, 04/07/2021, 11/08/2021   • Flu Vaccine Quad PF >36MO 10/13/2020   • Fluad Quad 65+ 10/26/2021   • Influenza, Unspecified 10/15/2019   • Pneumococcal Conjugate 13-Valent (PCV13) 01/27/2017   •  Pneumococcal Polysaccharide (PPSV23) 2017     Past Medical History:   Diagnosis Date   • Acid reflux    • Allergic rhinitis, seasonal    • Anemia    • Condition not found     HERNIA   • Hemorrhoids    • Thyroid disease       Past Surgical History:   Procedure Laterality Date   • COLONOSCOPY        Social History     Socioeconomic History   • Marital status:    Tobacco Use   • Smoking status: Current Every Day Smoker     Packs/day: 1.50   • Smokeless tobacco: Never Used   • Tobacco comment: DOWN TO <1PK DAILY 19   Vaping Use   • Vaping Use: Never used   Substance and Sexual Activity   • Alcohol use: Defer     Comment: CURRENT STATUS UNKNOWN    • Drug use: Never        Current Outpatient Medications:   •  aspirin 81 MG EC tablet, Aspir-81 81 mg oral tablet,delayed release (DR/EC) take 1 tablet (81 mg) by oral route once daily   Active, Disp: , Rfl:   •  lisinopril (PRINIVIL,ZESTRIL) 5 MG tablet, Take 1 tablet by mouth Daily. Pt cutting in half 5 mg daily, Disp: 90 tablet, Rfl: 1  •  simvastatin (ZOCOR) 20 MG tablet, Take 1 tablet by mouth Every Night., Disp: 90 tablet, Rfl: 1  •  celecoxib (CeleBREX) 200 MG capsule, Take 1 capsule by mouth Daily., Disp: 90 capsule, Rfl: 1  •  cyclobenzaprine (FLEXERIL) 5 MG tablet, Take 1 tablet by mouth At Night As Needed (cervical muscle spasm)., Disp: 14 tablet, Rfl: 0   Family History   Problem Relation Age of Onset   • Cancer Father          AT 78; BROTHER OR SISTER NOT SPECIFIED-  AT AGE 23          Vital Signs:   Vitals:    09/15/22 0814   BP: 105/68   Pulse: 50   Temp: 98.2 °F (36.8 °C)   SpO2: 98%   Weight: 84.4 kg (186 lb)       Review of Systems   Constitutional: Negative for malaise/fatigue.   Eyes: Negative for blurred vision.   Respiratory: Negative for shortness of breath.    Cardiovascular: Negative for chest pain, palpitations, orthopnea and PND.   Musculoskeletal: Negative for myalgias and neck pain.   Neurological: Negative for  focal weakness and headaches.      Physical Exam  Vitals reviewed.   Constitutional:       Appearance: Normal appearance. He is well-developed.   HENT:      Head: Normocephalic and atraumatic.      Right Ear: External ear normal.      Left Ear: External ear normal.      Mouth/Throat:      Pharynx: No oropharyngeal exudate.   Eyes:      Conjunctiva/sclera: Conjunctivae normal.      Pupils: Pupils are equal, round, and reactive to light.   Cardiovascular:      Rate and Rhythm: Normal rate and regular rhythm.      Pulses: Normal pulses.      Heart sounds: Normal heart sounds. No murmur heard.    No friction rub. No gallop.   Pulmonary:      Effort: Pulmonary effort is normal.      Breath sounds: Normal breath sounds. No wheezing or rhonchi.   Abdominal:      General: Abdomen is flat. Bowel sounds are normal. There is no distension.      Palpations: Abdomen is soft. There is no mass.      Tenderness: There is no abdominal tenderness. There is no guarding or rebound.      Hernia: No hernia is present.   Musculoskeletal:         General: Normal range of motion.   Skin:     General: Skin is warm and dry.      Capillary Refill: Capillary refill takes less than 2 seconds.   Neurological:      General: No focal deficit present.      Mental Status: He is alert and oriented to person, place, and time.      Cranial Nerves: No cranial nerve deficit.   Psychiatric:         Mood and Affect: Mood and affect normal.         Behavior: Behavior normal.         Thought Content: Thought content normal.         Judgment: Judgment normal.        Result Review :   The following data was reviewed by: Jeffrey Mcmullen MD on 09/15/2022:  CMP    CMP 10/26/21   Glucose 82   BUN 10   Creatinine 0.92   eGFR Non African Am 83   Sodium 142   Potassium 4.0   Chloride 106   Calcium 9.8   Albumin 4.30   Total Bilirubin 0.2   Alkaline Phosphatase 75   AST (SGOT) 19   ALT (SGPT) 19                       PSA    PSA 3/17/22   PSA 1.470                      Assessment and Plan    Diagnoses and all orders for this visit:    1. Mixed hyperlipidemia (Primary)  -     simvastatin (ZOCOR) 20 MG tablet; Take 1 tablet by mouth Every Night.  Dispense: 90 tablet; Refill: 1    2. Primary hypertension  -     lisinopril (PRINIVIL,ZESTRIL) 5 MG tablet; Take 1 tablet by mouth Daily. Pt cutting in half 5 mg daily  Dispense: 90 tablet; Refill: 1    3. Tobacco abuse  -      CT Chest Low Dose Cancer Screening WO; Future    4. Personal history of nicotine dependence   -      CT Chest Low Dose Cancer Screening WO; Future    5. Arthritis  -     celecoxib (CeleBREX) 200 MG capsule; Take 1 capsule by mouth Daily.  Dispense: 90 capsule; Refill: 1            Follow Up   Return in about 6 months (around 3/15/2023) for Recheck.  Patient was given instructions and counseling regarding his condition or for health maintenance advice. Please see specific information pulled into the AVS if appropriate.       Answers for HPI/ROS submitted by the patient on 9/10/2022  What is the primary reason for your visit?: Other  Please describe your symptoms.: None  Have you had these symptoms before?: No  How long have you been having these symptoms?: 1-4 days

## 2022-09-28 ENCOUNTER — HOSPITAL ENCOUNTER (OUTPATIENT)
Dept: CT IMAGING | Facility: HOSPITAL | Age: 67
Discharge: HOME | End: 2022-09-28
Admitting: FAMILY MEDICINE

## 2022-09-28 DIAGNOSIS — Z87.891 PERSONAL HISTORY OF NICOTINE DEPENDENCE: ICD-10-CM

## 2022-09-28 DIAGNOSIS — Z72.0 TOBACCO ABUSE: ICD-10-CM

## 2022-09-28 PROCEDURE — 71271 CT THORAX LUNG CANCER SCR C-: CPT

## 2022-09-29 DIAGNOSIS — R91.1 LUNG NODULE: Primary | ICD-10-CM

## 2023-03-15 DIAGNOSIS — M19.90 ARTHRITIS: ICD-10-CM

## 2023-03-15 DIAGNOSIS — E78.2 MIXED HYPERLIPIDEMIA: ICD-10-CM

## 2023-03-16 RX ORDER — CELECOXIB 200 MG/1
200 CAPSULE ORAL DAILY
Qty: 90 CAPSULE | Refills: 1 | OUTPATIENT
Start: 2023-03-16

## 2023-03-16 RX ORDER — SIMVASTATIN 20 MG
20 TABLET ORAL NIGHTLY
Qty: 90 TABLET | Refills: 1 | OUTPATIENT
Start: 2023-03-16

## 2023-03-30 ENCOUNTER — OFFICE VISIT (OUTPATIENT)
Dept: FAMILY MEDICINE CLINIC | Facility: CLINIC | Age: 68
End: 2023-03-30
Payer: MEDICARE

## 2023-03-30 VITALS
BODY MASS INDEX: 27.13 KG/M2 | HEART RATE: 64 BPM | OXYGEN SATURATION: 97 % | HEIGHT: 68 IN | WEIGHT: 179 LBS | SYSTOLIC BLOOD PRESSURE: 110 MMHG | DIASTOLIC BLOOD PRESSURE: 70 MMHG | TEMPERATURE: 97.3 F

## 2023-03-30 DIAGNOSIS — I10 PRIMARY HYPERTENSION: ICD-10-CM

## 2023-03-30 DIAGNOSIS — E78.2 MIXED HYPERLIPIDEMIA: ICD-10-CM

## 2023-03-30 DIAGNOSIS — R20.0 NUMBNESS: ICD-10-CM

## 2023-03-30 DIAGNOSIS — Z12.5 SCREENING PSA (PROSTATE SPECIFIC ANTIGEN): ICD-10-CM

## 2023-03-30 DIAGNOSIS — M19.90 ARTHRITIS: ICD-10-CM

## 2023-03-30 DIAGNOSIS — F32.A DEPRESSION, UNSPECIFIED DEPRESSION TYPE: ICD-10-CM

## 2023-03-30 DIAGNOSIS — Z00.00 MEDICARE ANNUAL WELLNESS VISIT, SUBSEQUENT: Primary | ICD-10-CM

## 2023-03-30 DIAGNOSIS — R25.2 MUSCLE CRAMPS: ICD-10-CM

## 2023-03-30 LAB
ALBUMIN SERPL-MCNC: 4.6 G/DL (ref 3.5–5.2)
ALBUMIN/GLOB SERPL: 1.5 G/DL
ALP SERPL-CCNC: 69 U/L (ref 39–117)
ALT SERPL W P-5'-P-CCNC: 22 U/L (ref 1–41)
ANION GAP SERPL CALCULATED.3IONS-SCNC: 9.2 MMOL/L (ref 5–15)
AST SERPL-CCNC: 26 U/L (ref 1–40)
BASOPHILS # BLD AUTO: 0.1 10*3/MM3 (ref 0–0.2)
BASOPHILS NFR BLD AUTO: 1.2 % (ref 0–1.5)
BILIRUB SERPL-MCNC: 0.5 MG/DL (ref 0–1.2)
BUN SERPL-MCNC: 18 MG/DL (ref 8–23)
BUN/CREAT SERPL: 15.8 (ref 7–25)
CALCIUM SPEC-SCNC: 9.9 MG/DL (ref 8.6–10.5)
CHLORIDE SERPL-SCNC: 102 MMOL/L (ref 98–107)
CHOLEST SERPL-MCNC: 113 MG/DL (ref 0–200)
CO2 SERPL-SCNC: 26.8 MMOL/L (ref 22–29)
CREAT SERPL-MCNC: 1.14 MG/DL (ref 0.76–1.27)
DEPRECATED RDW RBC AUTO: 42.3 FL (ref 37–54)
EGFRCR SERPLBLD CKD-EPI 2021: 70.9 ML/MIN/1.73
EOSINOPHIL # BLD AUTO: 0.32 10*3/MM3 (ref 0–0.4)
EOSINOPHIL NFR BLD AUTO: 3.7 % (ref 0.3–6.2)
ERYTHROCYTE [DISTWIDTH] IN BLOOD BY AUTOMATED COUNT: 12.1 % (ref 12.3–15.4)
FOLATE SERPL-MCNC: 17.3 NG/ML (ref 4.78–24.2)
GLOBULIN UR ELPH-MCNC: 3.1 GM/DL
GLUCOSE SERPL-MCNC: 89 MG/DL (ref 65–99)
HCT VFR BLD AUTO: 48.3 % (ref 37.5–51)
HDLC SERPL-MCNC: 46 MG/DL (ref 40–60)
HGB BLD-MCNC: 16.2 G/DL (ref 13–17.7)
IMM GRANULOCYTES # BLD AUTO: 0.03 10*3/MM3 (ref 0–0.05)
IMM GRANULOCYTES NFR BLD AUTO: 0.3 % (ref 0–0.5)
LDLC SERPL CALC-MCNC: 52 MG/DL (ref 0–100)
LDLC/HDLC SERPL: 1.16 {RATIO}
LYMPHOCYTES # BLD AUTO: 2.49 10*3/MM3 (ref 0.7–3.1)
LYMPHOCYTES NFR BLD AUTO: 29 % (ref 19.6–45.3)
MAGNESIUM SERPL-MCNC: 2.2 MG/DL (ref 1.6–2.4)
MCH RBC QN AUTO: 31.8 PG (ref 26.6–33)
MCHC RBC AUTO-ENTMCNC: 33.5 G/DL (ref 31.5–35.7)
MCV RBC AUTO: 94.9 FL (ref 79–97)
MONOCYTES # BLD AUTO: 0.83 10*3/MM3 (ref 0.1–0.9)
MONOCYTES NFR BLD AUTO: 9.7 % (ref 5–12)
NEUTROPHILS NFR BLD AUTO: 4.82 10*3/MM3 (ref 1.7–7)
NEUTROPHILS NFR BLD AUTO: 56.1 % (ref 42.7–76)
NRBC BLD AUTO-RTO: 0 /100 WBC (ref 0–0.2)
PLATELET # BLD AUTO: 299 10*3/MM3 (ref 140–450)
PMV BLD AUTO: 10.1 FL (ref 6–12)
POTASSIUM SERPL-SCNC: 5 MMOL/L (ref 3.5–5.2)
PROT SERPL-MCNC: 7.7 G/DL (ref 6–8.5)
PSA SERPL-MCNC: 1.64 NG/ML (ref 0–4)
RBC # BLD AUTO: 5.09 10*6/MM3 (ref 4.14–5.8)
SODIUM SERPL-SCNC: 138 MMOL/L (ref 136–145)
T4 FREE SERPL-MCNC: 1.32 NG/DL (ref 0.93–1.7)
TRIGL SERPL-MCNC: 69 MG/DL (ref 0–150)
TSH SERPL DL<=0.05 MIU/L-ACNC: 0.42 UIU/ML (ref 0.27–4.2)
VIT B12 BLD-MCNC: 767 PG/ML (ref 211–946)
VLDLC SERPL-MCNC: 15 MG/DL (ref 5–40)
WBC NRBC COR # BLD: 8.59 10*3/MM3 (ref 3.4–10.8)

## 2023-03-30 PROCEDURE — G0103 PSA SCREENING: HCPCS | Performed by: FAMILY MEDICINE

## 2023-03-30 PROCEDURE — 82746 ASSAY OF FOLIC ACID SERUM: CPT | Performed by: FAMILY MEDICINE

## 2023-03-30 PROCEDURE — 83735 ASSAY OF MAGNESIUM: CPT | Performed by: FAMILY MEDICINE

## 2023-03-30 PROCEDURE — 84443 ASSAY THYROID STIM HORMONE: CPT | Performed by: FAMILY MEDICINE

## 2023-03-30 PROCEDURE — 82607 VITAMIN B-12: CPT | Performed by: FAMILY MEDICINE

## 2023-03-30 PROCEDURE — 85025 COMPLETE CBC W/AUTO DIFF WBC: CPT | Performed by: FAMILY MEDICINE

## 2023-03-30 PROCEDURE — 80053 COMPREHEN METABOLIC PANEL: CPT | Performed by: FAMILY MEDICINE

## 2023-03-30 PROCEDURE — 84439 ASSAY OF FREE THYROXINE: CPT | Performed by: FAMILY MEDICINE

## 2023-03-30 PROCEDURE — 80061 LIPID PANEL: CPT | Performed by: FAMILY MEDICINE

## 2023-03-30 RX ORDER — VENLAFAXINE HYDROCHLORIDE 37.5 MG/1
37.5 CAPSULE, EXTENDED RELEASE ORAL DAILY
Qty: 30 CAPSULE | Refills: 1 | Status: SHIPPED | OUTPATIENT
Start: 2023-03-30

## 2023-03-30 RX ORDER — CELECOXIB 200 MG/1
200 CAPSULE ORAL DAILY
Qty: 90 CAPSULE | Refills: 1 | Status: SHIPPED | OUTPATIENT
Start: 2023-03-30

## 2023-03-30 RX ORDER — LISINOPRIL 5 MG/1
5 TABLET ORAL DAILY
Qty: 90 TABLET | Refills: 1 | Status: SHIPPED | OUTPATIENT
Start: 2023-03-30

## 2023-03-30 RX ORDER — SIMVASTATIN 20 MG
20 TABLET ORAL NIGHTLY
Qty: 90 TABLET | Refills: 1 | Status: SHIPPED | OUTPATIENT
Start: 2023-03-30

## 2023-03-30 RX ORDER — CYCLOBENZAPRINE HCL 5 MG
5 TABLET ORAL 2 TIMES DAILY PRN
Qty: 60 TABLET | Refills: 1 | Status: SHIPPED | OUTPATIENT
Start: 2023-03-30

## 2023-03-30 NOTE — PROGRESS NOTES
The ABCs of the Annual Wellness Visit  Subsequent Medicare Wellness Visit    Subjective    Santos Smith is a 67 y.o. male who presents for a Subsequent Medicare Wellness Visit.    The following portions of the patient's history were reviewed and   updated as appropriate:   He  has a past medical history of Acid reflux, Allergic rhinitis, seasonal, Anemia, Condition not found, Hemorrhoids, and Thyroid disease.  He does not have any pertinent problems on file.  He  has a past surgical history that includes Colonoscopy.  His family history includes Cancer in his father.  He  reports that he has been smoking cigarettes. He has a 15.00 pack-year smoking history. He has never used smokeless tobacco. Alcohol use questions deferred to the physician. He reports that he does not use drugs.  Current Outpatient Medications   Medication Sig Dispense Refill   • aspirin 81 MG EC tablet Aspir-81 81 mg oral tablet,delayed release (DR/EC) take 1 tablet (81 mg) by oral route once daily   Active     • celecoxib (CeleBREX) 200 MG capsule Take 1 capsule by mouth Daily. 90 capsule 1   • cyclobenzaprine (FLEXERIL) 5 MG tablet Take 1 tablet by mouth 2 (Two) Times a Day As Needed for Muscle Spasms. 60 tablet 1   • lisinopril (PRINIVIL,ZESTRIL) 5 MG tablet Take 1 tablet by mouth Daily. Pt cutting in half 5 mg daily 90 tablet 1   • simvastatin (ZOCOR) 20 MG tablet Take 1 tablet by mouth Every Night. 90 tablet 1   • venlafaxine XR (Effexor XR) 37.5 MG 24 hr capsule Take 1 capsule by mouth Daily. 30 capsule 1     No current facility-administered medications for this visit.     Current Outpatient Medications on File Prior to Visit   Medication Sig   • aspirin 81 MG EC tablet Aspir-81 81 mg oral tablet,delayed release (DR/EC) take 1 tablet (81 mg) by oral route once daily   Active   • [DISCONTINUED] celecoxib (CeleBREX) 200 MG capsule Take 1 capsule by mouth Daily.   • [DISCONTINUED] cyclobenzaprine (FLEXERIL) 5 MG tablet Take 1 tablet by mouth  At Night As Needed (cervical muscle spasm).   • [DISCONTINUED] lisinopril (PRINIVIL,ZESTRIL) 5 MG tablet Take 1 tablet by mouth Daily. Pt cutting in half 5 mg daily   • [DISCONTINUED] simvastatin (ZOCOR) 20 MG tablet Take 1 tablet by mouth Every Night.     No current facility-administered medications on file prior to visit.     He is allergic to prednisone..    Compared to one year ago, the patient feels his physical   health is the same.    Compared to one year ago, the patient feels his mental   health is the same.    Recent Hospitalizations:  He was not admitted to the hospital during the last year.       Current Medical Providers:  Patient Care Team:  Jeffrey Mcmullen MD as PCP - General (Family Medicine)  Ginna Barth APRN as Nurse Practitioner (Family Medicine)  Provider, No Known    Outpatient Medications Prior to Visit   Medication Sig Dispense Refill   • aspirin 81 MG EC tablet Aspir-81 81 mg oral tablet,delayed release (DR/EC) take 1 tablet (81 mg) by oral route once daily   Active     • celecoxib (CeleBREX) 200 MG capsule Take 1 capsule by mouth Daily. 90 capsule 1   • cyclobenzaprine (FLEXERIL) 5 MG tablet Take 1 tablet by mouth At Night As Needed (cervical muscle spasm). 14 tablet 0   • lisinopril (PRINIVIL,ZESTRIL) 5 MG tablet Take 1 tablet by mouth Daily. Pt cutting in half 5 mg daily 90 tablet 1   • simvastatin (ZOCOR) 20 MG tablet Take 1 tablet by mouth Every Night. 90 tablet 1     No facility-administered medications prior to visit.       No opioid medication identified on active medication list. I have reviewed chart for other potential  high risk medication/s and harmful drug interactions in the elderly.          Aspirin is on active medication list. Aspirin use is indicated based on review of current medical condition/s. Pros and cons of this therapy have been discussed today. Benefits of this medication outweigh potential harm.  Patient has been encouraged to continue taking this  "medication.  .      Patient Active Problem List   Diagnosis   • Obesity (BMI 30.0-34.9)   • Mixed hyperlipidemia   • Primary hypertension   • Arthritis     Advance Care Planning  Advance Directive is not on file.  ACP discussion was held with the patient during this visit. Patient does not have an advance directive, information provided.     Objective    Vitals:    03/30/23 0810   BP: 110/70   Pulse: 64   Temp: 97.3 °F (36.3 °C)   SpO2: 97%   Weight: 81.2 kg (179 lb)   Height: 172.7 cm (68\")     Estimated body mass index is 27.22 kg/m² as calculated from the following:    Height as of this encounter: 172.7 cm (68\").    Weight as of this encounter: 81.2 kg (179 lb).    BMI is >= 25 and <30. (Overweight) The following options were offered after discussion;: exercise counseling/recommendations and nutrition counseling/recommendations      Does the patient have evidence of cognitive impairment? No          HEALTH RISK ASSESSMENT    Smoking Status:  Social History     Tobacco Use   Smoking Status Every Day   • Packs/day: 0.75   • Years: 20.00   • Pack years: 15.00   • Types: Cigarettes   Smokeless Tobacco Never   Tobacco Comments    DOWN TO <1PK DAILY 12/31/19     Alcohol Consumption:  Social History     Substance and Sexual Activity   Alcohol Use Defer    Comment: CURRENT STATUS UNKNOWN      Fall Risk Screen:    GITAADI Fall Risk Assessment was completed, and patient is at LOW risk for falls.Assessment completed on:3/30/2023    Depression Screening:  PHQ-2/PHQ-9 Depression Screening 3/30/2023   Little Interest or Pleasure in Doing Things 1-->several days   Feeling Down, Depressed or Hopeless 2-->more than half the days   Trouble Falling or Staying Asleep, or Sleeping Too Much 2-->more than half the days   Feeling Tired or Having Little Energy 0-->not at all   Poor Appetite or Overeating 0-->not at all   Feeling Bad about Yourself - or that You are a Failure or Have Let Yourself or Your Family Down 1-->several days "   Trouble Concentrating on Things, Such as Reading the Newspaper or Watching Television 0-->not at all   Moving or Speaking So Slowly that Other People Could Have Noticed? Or the Opposite - Being So Fidgety 0-->not at all   Thoughts that You Would be Better Off Dead or of Hurting Yourself in Some Way 0-->not at all   PHQ-9: Brief Depression Severity Measure Score 6   If You Checked Off Any Problems, How Difficult Have These Problems Made It For You to Do Your Work, Take Care of Things at Home, or Get Along with Other People? somewhat difficult       Health Habits and Functional and Cognitive Screening:  Functional & Cognitive Status 3/30/2023   Do you have difficulty preparing food and eating? No   Do you have difficulty bathing yourself, getting dressed or grooming yourself? No   Do you have difficulty using the toilet? No   Do you have difficulty moving around from place to place? No   Do you have trouble with steps or getting out of a bed or a chair? No   Current Diet Well Balanced Diet   Dental Exam Up to date   Eye Exam Not up to date   Exercise (times per week) 7 times per week   Current Exercises Include Walking   Do you need help using the phone?  No   Are you deaf or do you have serious difficulty hearing?  No   Do you need help with transportation? No   Do you need help shopping? No   Do you need help preparing meals?  No   Do you need help with housework?  No   Do you need help with laundry? No   Do you need help taking your medications? No   Do you need help managing money? No   Do you ever drive or ride in a car without wearing a seat belt? No   Have you felt unusual stress, anger or loneliness in the last month? Yes   Who do you live with? Alone   If you need help, do you have trouble finding someone available to you? No   Have you been bothered in the last four weeks by sexual problems? No   Do you have difficulty concentrating, remembering or making decisions? No       Age-appropriate Screening  Schedule:  Refer to the list below for future screening recommendations based on patient's age, sex and/or medical conditions. Orders for these recommended tests are listed in the plan section. The patient has been provided with a written plan.    Health Maintenance   Topic Date Due   • TDAP/TD VACCINES (1 - Tdap) Never done   • COVID-19 Vaccine (5 - Booster for Moderna series) 08/18/2022   • LIPID PANEL  09/15/2023   • ANNUAL WELLNESS VISIT  03/30/2024   • COLORECTAL CANCER SCREENING  03/08/2026   • HEPATITIS C SCREENING  Completed   • INFLUENZA VACCINE  Completed   • Pneumococcal Vaccine 65+  Completed   • AAA SCREEN (ONE-TIME)  Completed   • ZOSTER VACCINE  Completed                CMS Preventative Services Quick Reference  Risk Factors Identified During Encounter  Immunizations Discussed/Encouraged: Tdap  Inactivity/Sedentary: Patient was advised to exercise at least 150 minutes a week per CDC recommendations.  The above risks/problems have been discussed with the patient.  Pertinent information has been shared with the patient in the After Visit Summary.  An After Visit Summary and PPPS were made available to the patient.    Follow Up:   Next Medicare Wellness visit to be scheduled in 1 year.       Additional E&M Note during same encounter follows:  Patient has multiple medical problems which are significant and separately identifiable that require additional work above and beyond the Medicare Wellness Visit.      Chief Complaint  Medicare Wellness-subsequent and Leg Pain (Cramps/aches in right leg X2-3  months )    Subjective        Pt has had muscle cramps and numbness in extremities intermittently x 2 months- sudden onset- symptoms unchanged    Pt lost his wife to cancer in February- pt has some depression- no SI or HI- pt has good support with family- is involved with support group through hospice  Pt smokes less than 1ppd now- pt is weaning down    Hypertension  This is a chronic problem. The current  episode started more than 1 year ago. The problem has been gradually improving since onset. The problem is controlled. Pertinent negatives include no anxiety, blurred vision, chest pain, headaches, malaise/fatigue, neck pain, orthopnea, palpitations, peripheral edema, PND, shortness of breath or sweats. There are no associated agents to hypertension. Risk factors for coronary artery disease include dyslipidemia, male gender and family history. Current antihypertension treatment includes ACE inhibitors. The current treatment provides significant improvement. There are no compliance problems.    Hyperlipidemia  This is a chronic problem. The current episode started more than 1 year ago. The problem is controlled. Recent lipid tests were reviewed and are variable. There are no known factors aggravating his hyperlipidemia. Pertinent negatives include no chest pain, focal sensory loss, focal weakness, leg pain, myalgias or shortness of breath. Current antihyperlipidemic treatment includes statins. The current treatment provides significant improvement of lipids. There are no compliance problems.      Santos Smith is also being seen today for HTN, hyperlipidemia, leg pain, depression    Review of Systems   Constitutional: Negative for fatigue, fever and malaise/fatigue.   HENT: Negative for sore throat.    Eyes: Negative for blurred vision and visual disturbance.   Respiratory: Negative for cough, chest tightness, shortness of breath and wheezing.    Cardiovascular: Negative for chest pain, palpitations, orthopnea, leg swelling and PND.   Gastrointestinal: Negative for abdominal pain, diarrhea, nausea and vomiting.   Musculoskeletal: Negative for myalgias and neck pain.   Skin: Negative for rash.   Neurological: Negative for dizziness, focal weakness and light-headedness.   Psychiatric/Behavioral: Positive for dysphoric mood. Negative for decreased concentration, sleep disturbance and suicidal ideas. The patient is not  "nervous/anxious.        Objective   Vital Signs:  /70   Pulse 64   Temp 97.3 °F (36.3 °C)   Ht 172.7 cm (68\")   Wt 81.2 kg (179 lb)   SpO2 97%   BMI 27.22 kg/m²     Physical Exam  Vitals reviewed.   Constitutional:       Appearance: Normal appearance. He is well-developed.   HENT:      Head: Normocephalic and atraumatic.      Right Ear: External ear normal.      Left Ear: External ear normal.      Mouth/Throat:      Pharynx: No oropharyngeal exudate.   Eyes:      Conjunctiva/sclera: Conjunctivae normal.      Pupils: Pupils are equal, round, and reactive to light.   Cardiovascular:      Rate and Rhythm: Normal rate and regular rhythm.      Pulses: Normal pulses.      Heart sounds: Normal heart sounds. No murmur heard.    No friction rub. No gallop.   Pulmonary:      Effort: Pulmonary effort is normal.      Breath sounds: Normal breath sounds. No wheezing or rhonchi.   Abdominal:      General: Abdomen is flat. Bowel sounds are normal. There is no distension.      Palpations: Abdomen is soft. There is no mass.      Tenderness: There is no abdominal tenderness. There is no guarding or rebound.      Hernia: No hernia is present.   Musculoskeletal:         General: Normal range of motion.   Skin:     General: Skin is warm and dry.      Capillary Refill: Capillary refill takes less than 2 seconds.   Neurological:      General: No focal deficit present.      Mental Status: He is alert and oriented to person, place, and time.      Cranial Nerves: No cranial nerve deficit.   Psychiatric:         Mood and Affect: Mood and affect normal.         Behavior: Behavior normal.         Thought Content: Thought content normal.         Judgment: Judgment normal.          The following data was reviewed by: Jeffrey Mcmullen MD on 03/30/2023:  CMP    CMP 9/15/22   Glucose 95   BUN 15   Creatinine 0.76   eGFR 99.1   Sodium 139   Potassium 4.5   Chloride 104   Calcium 9.7   Total Protein 7.0   Albumin 4.70   Globulin 2.3   Total " Bilirubin 0.3   Alkaline Phosphatase 71   AST (SGOT) 25   ALT (SGPT) 18   Albumin/Globulin Ratio 2.0   BUN/Creatinine Ratio 19.7   Anion Gap 8.0      Comments are available for some flowsheets but are not being displayed.           CBC    CBC 9/15/22   WBC 10.79   RBC 5.06   Hemoglobin 16.6   Hematocrit 49.3   MCV 97.4 (A)   MCH 32.8   MCHC 33.7   RDW 12.1 (A)   Platelets 295   (A) Abnormal value            Lipid Panel    Lipid Panel 9/15/22   Total Cholesterol 104   Triglycerides 67   HDL Cholesterol 42   VLDL Cholesterol 14   LDL Cholesterol  48   LDL/HDL Ratio 1.16           TSH    TSH 9/15/22   TSH 0.385                          Assessment and Plan   Diagnoses and all orders for this visit:    1. Medicare annual wellness visit, subsequent (Primary)    2. Arthritis  -     celecoxib (CeleBREX) 200 MG capsule; Take 1 capsule by mouth Daily.  Dispense: 90 capsule; Refill: 1    3. Primary hypertension  -     lisinopril (PRINIVIL,ZESTRIL) 5 MG tablet; Take 1 tablet by mouth Daily. Pt cutting in half 5 mg daily  Dispense: 90 tablet; Refill: 1  -     CBC Auto Differential  -     Comprehensive Metabolic Panel  -     Lipid Panel  -     TSH+Free T4    4. Mixed hyperlipidemia  -     simvastatin (ZOCOR) 20 MG tablet; Take 1 tablet by mouth Every Night.  Dispense: 90 tablet; Refill: 1  -     Lipid Panel    5. Screening PSA (prostate specific antigen)  -     PSA Screen    6. Numbness  -     Vitamin B12 & Folate    7. Muscle cramps  -     cyclobenzaprine (FLEXERIL) 5 MG tablet; Take 1 tablet by mouth 2 (Two) Times a Day As Needed for Muscle Spasms.  Dispense: 60 tablet; Refill: 1  -     Magnesium    8. Depression, unspecified depression type  -     venlafaxine XR (Effexor XR) 37.5 MG 24 hr capsule; Take 1 capsule by mouth Daily.  Dispense: 30 capsule; Refill: 1             Follow Up   Return in about 3 weeks (around 4/20/2023) for Recheck.  Patient was given instructions and counseling regarding his condition or for health  maintenance advice. Please see specific information pulled into the AVS if appropriate.         Answers for HPI/ROS submitted by the patient on 3/23/2023  What is the primary reason for your visit?: Other  Please describe your symptoms.: needing persrptions and pain up and down right leg  Have you had these symptoms before?: No  How long have you been having these symptoms?: Greater than 2 weeks  Please list any medications you are currently taking for this condition.: celecoxib.        lisinopril.    simvastatin  Please describe any probable cause for these symptoms. : none

## 2023-03-30 NOTE — PROGRESS NOTES
Chief Complaint  Medicare Wellness-subsequent and Leg Pain (Cramps/aches in right leg X2-3  months )    Subjective     {Problem List  Visit Diagnosis   Encounters  Notes  Medications  Labs  Result Review Imaging  Media :23}     Santos Smith presents to [facility]  History of Present Illness    Objective   Allergies   Allergen Reactions   • Prednisone Arrhythmia     Immunization History   Administered Date(s) Administered   • COVID-19 (MODERNA) 1st, 2nd, 3rd Dose Only 03/10/2021, 04/07/2021, 11/08/2021   • COVID-19 (MODERNA) BOOSTER 06/23/2022   • Flu Vaccine Quad PF >36MO 10/13/2020   • Fluad Quad 65+ 10/26/2021   • Fluzone High-Dose 65+yrs 09/21/2022   • Influenza, Unspecified 10/15/2019   • Pneumococcal Conjugate 13-Valent (PCV13) 01/27/2017   • Pneumococcal Conjugate 20-Valent (PCV20) 10/13/2022   • Pneumococcal Polysaccharide (PPSV23) 08/23/2017   • Shingrix 06/23/2022, 10/13/2022     Past Medical History:   Diagnosis Date   • Acid reflux    • Allergic rhinitis, seasonal    • Anemia    • Condition not found     HERNIA   • Hemorrhoids    • Thyroid disease       Past Surgical History:   Procedure Laterality Date   • COLONOSCOPY        Social History     Socioeconomic History   • Marital status:    Tobacco Use   • Smoking status: Every Day     Packs/day: 0.75     Years: 20.00     Pack years: 15.00     Types: Cigarettes   • Smokeless tobacco: Never   • Tobacco comments:     DOWN TO <1PK DAILY 12/31/19   Vaping Use   • Vaping Use: Never used   Substance and Sexual Activity   • Alcohol use: Defer     Comment: CURRENT STATUS UNKNOWN    • Drug use: Never        Current Outpatient Medications:   •  aspirin 81 MG EC tablet, Aspir-81 81 mg oral tablet,delayed release (DR/EC) take 1 tablet (81 mg) by oral route once daily   Active, Disp: , Rfl:   •  celecoxib (CeleBREX) 200 MG capsule, Take 1 capsule by mouth Daily., Disp: 90 capsule, Rfl: 1  •  cyclobenzaprine (FLEXERIL)  "5 MG tablet, Take 1 tablet by mouth At Night As Needed (cervical muscle spasm)., Disp: 14 tablet, Rfl: 0  •  lisinopril (PRINIVIL,ZESTRIL) 5 MG tablet, Take 1 tablet by mouth Daily. Pt cutting in half 5 mg daily, Disp: 90 tablet, Rfl: 1  •  simvastatin (ZOCOR) 20 MG tablet, Take 1 tablet by mouth Every Night., Disp: 90 tablet, Rfl: 1   Family History   Problem Relation Age of Onset   • Cancer Father          AT 78; BROTHER OR SISTER NOT SPECIFIED-  AT AGE 23          Vital Signs:   Vitals:    23 0810   BP: 110/70   Pulse: 64   Temp: 97.3 °F (36.3 °C)   SpO2: 97%   Weight: 81.2 kg (179 lb)   Height: 172.7 cm (68\")       Review of Systems   Physical Exam   Result Review :{Labs  Result Review  Imaging  Med Tab  Media  Procedures :23}   {The following data was reviewed by (Optional):95593}  {Ambulatory Labs (Optional):09371}  {Data reviewed (Optional):14028:::1}          Assessment and Plan {CC Problem List  Visit Diagnosis   ROS  Review (Popup)  Health Maintenance  Quality  BestPractice  Medications  SmartSets  SnapShot Encounters  Media :23}   There are no diagnoses linked to this encounter.  {Time Spent (Optional):98385}      Follow Up {Instructions Charge Capture  Follow-up Communications :23}  No follow-ups on file.  Patient was given instructions and counseling regarding his condition or for health maintenance advice. Please see specific information pulled into the AVS if appropriate.       Answers for HPI/ROS submitted by the patient on 3/23/2023  What is the primary reason for your visit?: Other  Please describe your symptoms.: needing persrptions and pain up and down right leg  Have you had these symptoms before?: No  How long have you been having these symptoms?: Greater than 2 weeks  Please list any medications you are currently taking for this condition.: celecoxib.        lisinopril.    simvastatin  Please describe any probable cause for these symptoms. : none      "

## 2023-03-30 NOTE — PROGRESS NOTES
Venipuncture Blood Specimen Collection  Venipuncture performed in left arm by Monica Su with good hemostasis. Patient tolerated the procedure well without complications.   03/30/23   Monica Su

## 2023-05-04 ENCOUNTER — OFFICE VISIT (OUTPATIENT)
Dept: FAMILY MEDICINE CLINIC | Facility: CLINIC | Age: 68
End: 2023-05-04
Payer: MEDICARE

## 2023-05-04 VITALS
DIASTOLIC BLOOD PRESSURE: 80 MMHG | WEIGHT: 182.4 LBS | SYSTOLIC BLOOD PRESSURE: 122 MMHG | TEMPERATURE: 97.1 F | BODY MASS INDEX: 27.65 KG/M2 | OXYGEN SATURATION: 98 % | HEART RATE: 56 BPM | HEIGHT: 68 IN

## 2023-05-04 DIAGNOSIS — F32.A DEPRESSION, UNSPECIFIED DEPRESSION TYPE: ICD-10-CM

## 2023-05-04 PROCEDURE — 99213 OFFICE O/P EST LOW 20 MIN: CPT | Performed by: FAMILY MEDICINE

## 2023-05-04 PROCEDURE — 3074F SYST BP LT 130 MM HG: CPT | Performed by: FAMILY MEDICINE

## 2023-05-04 PROCEDURE — 1160F RVW MEDS BY RX/DR IN RCRD: CPT | Performed by: FAMILY MEDICINE

## 2023-05-04 PROCEDURE — 1159F MED LIST DOCD IN RCRD: CPT | Performed by: FAMILY MEDICINE

## 2023-05-04 PROCEDURE — 3079F DIAST BP 80-89 MM HG: CPT | Performed by: FAMILY MEDICINE

## 2023-05-04 RX ORDER — VENLAFAXINE HYDROCHLORIDE 37.5 MG/1
37.5 CAPSULE, EXTENDED RELEASE ORAL DAILY
Qty: 90 CAPSULE | Refills: 0 | Status: SHIPPED | OUTPATIENT
Start: 2023-05-04

## 2023-05-04 RX ORDER — IBUPROFEN 800 MG/1
800 TABLET ORAL EVERY 6 HOURS PRN
COMMUNITY
Start: 2023-04-20

## 2023-05-04 NOTE — PROGRESS NOTES
Chief Complaint  Depression (Follow up on depression )    Subjective          Santos Smith presents to [facility]  History of Present Illness  Pt says med is working well- less depression- pt sleeping better- pt is satisfied with currrent dose of med  Depression  Visit Type: follow-up  Patient is not experiencing: anhedonia, chest pain, choking sensation, compulsions, confusion, decreased concentration, depressed mood, dizziness, dry mouth, excessive worry, fatigue, feelings of hopelessness, feelings of worthlessness, hypersomnia, hyperventilation, insomnia, irritability, malaise, memory impairment, muscle tension, nausea, nervousness/anxiety, obsessions, palpitations, panic, psychomotor agitation, psychomotor retardation, restlessness, shortness of breath, suicidal ideas, suicidal planning, thoughts of death, weight gain and weight loss.  Frequency of symptoms: occasionally   Severity: moderate   Sleep quality: good  Nighttime awakenings: none  Compliance with medications:  %  Treatment side effects: no side effects.      Objective   Allergies   Allergen Reactions   • Prednisone Arrhythmia     Immunization History   Administered Date(s) Administered   • COVID-19 (MODERNA) 1st,2nd,3rd Dose Monovalent 03/10/2021, 04/07/2021, 11/08/2021   • COVID-19 (MODERNA) Monovalent Original Booster 06/23/2022   • Flu Vaccine Quad PF >36MO 10/13/2020   • Fluad Quad 65+ 10/26/2021   • Fluzone High-Dose 65+yrs 09/21/2022   • Influenza, Unspecified 10/15/2019   • Pneumococcal Conjugate 13-Valent (PCV13) 01/27/2017   • Pneumococcal Conjugate 20-Valent (PCV20) 10/13/2022   • Pneumococcal Polysaccharide (PPSV23) 08/23/2017   • Shingrix 06/23/2022, 10/13/2022     Past Medical History:   Diagnosis Date   • Acid reflux    • Allergic rhinitis, seasonal    • Anemia    • Condition not found     HERNIA   • Hemorrhoids    • Thyroid disease       Past Surgical History:   Procedure Laterality Date   •  "COLONOSCOPY        Social History     Socioeconomic History   • Marital status:    Tobacco Use   • Smoking status: Every Day     Packs/day: 0.75     Years: 20.00     Pack years: 15.00     Types: Cigarettes   • Smokeless tobacco: Never   • Tobacco comments:     DOWN TO <1PK DAILY 19   Vaping Use   • Vaping Use: Never used   Substance and Sexual Activity   • Alcohol use: Defer     Comment: CURRENT STATUS UNKNOWN    • Drug use: Never        Current Outpatient Medications:   •  aspirin 81 MG EC tablet, Aspir-81 81 mg oral tablet,delayed release (DR/EC) take 1 tablet (81 mg) by oral route once daily   Active, Disp: , Rfl:   •  celecoxib (CeleBREX) 200 MG capsule, Take 1 capsule by mouth Daily., Disp: 90 capsule, Rfl: 1  •  cyclobenzaprine (FLEXERIL) 5 MG tablet, Take 1 tablet by mouth 2 (Two) Times a Day As Needed for Muscle Spasms., Disp: 60 tablet, Rfl: 1  •  ibuprofen (ADVIL,MOTRIN) 800 MG tablet, Take 1 tablet by mouth Every 6 (Six) Hours As Needed. for pain, Disp: , Rfl:   •  lisinopril (PRINIVIL,ZESTRIL) 5 MG tablet, Take 1 tablet by mouth Daily. Pt cutting in half 5 mg daily, Disp: 90 tablet, Rfl: 1  •  simvastatin (ZOCOR) 20 MG tablet, Take 1 tablet by mouth Every Night., Disp: 90 tablet, Rfl: 1  •  venlafaxine XR (Effexor XR) 37.5 MG 24 hr capsule, Take 1 capsule by mouth Daily., Disp: 90 capsule, Rfl: 0   Family History   Problem Relation Age of Onset   • Cancer Father          AT 78; BROTHER OR SISTER NOT SPECIFIED-  AT AGE 23          Vital Signs:   Vitals:    23 0942   BP: 122/80   Pulse: 56   Temp: 97.1 °F (36.2 °C)   SpO2: 98%   Weight: 82.7 kg (182 lb 6.4 oz)   Height: 172.7 cm (68\")       Review of Systems   Constitutional: Negative for irritability, weight gain and weight loss.   Respiratory: Negative for choking and shortness of breath.    Cardiovascular: Negative for palpitations.   Psychiatric/Behavioral: Negative for confusion, decreased concentration and suicidal " ideas. The patient is not nervous/anxious and does not have insomnia.       Physical Exam  Vitals reviewed.   Constitutional:       Appearance: Normal appearance. He is well-developed.   HENT:      Head: Normocephalic and atraumatic.      Right Ear: External ear normal.      Left Ear: External ear normal.      Mouth/Throat:      Pharynx: No oropharyngeal exudate.   Eyes:      Conjunctiva/sclera: Conjunctivae normal.      Pupils: Pupils are equal, round, and reactive to light.   Cardiovascular:      Rate and Rhythm: Normal rate and regular rhythm.      Pulses: Normal pulses.      Heart sounds: Normal heart sounds. No murmur heard.    No friction rub. No gallop.   Pulmonary:      Effort: Pulmonary effort is normal.      Breath sounds: Normal breath sounds. No wheezing or rhonchi.   Abdominal:      General: Abdomen is flat. Bowel sounds are normal. There is no distension.      Palpations: Abdomen is soft. There is no mass.      Tenderness: There is no abdominal tenderness. There is no guarding or rebound.      Hernia: No hernia is present.   Musculoskeletal:         General: Normal range of motion.   Skin:     General: Skin is warm and dry.      Capillary Refill: Capillary refill takes less than 2 seconds.   Neurological:      General: No focal deficit present.      Mental Status: He is alert and oriented to person, place, and time.      Cranial Nerves: No cranial nerve deficit.   Psychiatric:         Mood and Affect: Mood and affect normal.         Behavior: Behavior normal.         Thought Content: Thought content normal.         Judgment: Judgment normal.        Result Review :   The following data was reviewed by: Jeffrey Mcmullen MD on 05/04/2023:  CMP        9/15/2022    09:11 3/30/2023    09:00   CMP   Glucose 95   89     BUN 15   18     Creatinine 0.76   1.14     EGFR 99.1   70.9     Sodium 139   138     Potassium 4.5   5.0     Chloride 104   102     Calcium 9.7   9.9     Total Protein 7.0   7.7     Albumin 4.70    4.6     Globulin 2.3   3.1     Total Bilirubin 0.3   0.5     Alkaline Phosphatase 71   69     AST (SGOT) 25   26     ALT (SGPT) 18   22     Albumin/Globulin Ratio 2.0   1.5     BUN/Creatinine Ratio 19.7   15.8     Anion Gap 8.0   9.2       CBC        9/15/2022    09:11 3/30/2023    09:00   CBC   WBC 10.79   8.59     RBC 5.06   5.09     Hemoglobin 16.6   16.2     Hematocrit 49.3   48.3     MCV 97.4   94.9     MCH 32.8   31.8     MCHC 33.7   33.5     RDW 12.1   12.1     Platelets 295   299       Lipid Panel        9/15/2022    09:11 3/30/2023    09:00   Lipid Panel   Total Cholesterol 104   113     Triglycerides 67   69     HDL Cholesterol 42   46     VLDL Cholesterol 14   15     LDL Cholesterol  48   52     LDL/HDL Ratio 1.16   1.16       TSH        9/15/2022    09:11 3/30/2023    09:00   TSH   TSH 0.385   0.421                 Assessment and Plan    Diagnoses and all orders for this visit:    1. Depression, unspecified depression type  -     venlafaxine XR (Effexor XR) 37.5 MG 24 hr capsule; Take 1 capsule by mouth Daily.  Dispense: 90 capsule; Refill: 0            Follow Up   Return in about 3 months (around 8/4/2023) for Recheck.  Patient was given instructions and counseling regarding his condition or for health maintenance advice. Please see specific information pulled into the AVS if appropriate.       Answers for HPI/ROS submitted by the patient on 5/2/2023  What is the primary reason for your visit?: Other  Please describe your symptoms.: follow up  Have you had these symptoms before?: Yes  How long have you been having these symptoms?: Greater than 2 weeks

## 2023-05-25 DIAGNOSIS — R91.1 LUNG NODULE: Primary | ICD-10-CM

## 2023-06-15 DIAGNOSIS — R91.1 LUNG NODULE: Primary | ICD-10-CM

## 2023-09-21 DIAGNOSIS — M19.90 ARTHRITIS: ICD-10-CM

## 2023-09-21 DIAGNOSIS — E78.2 MIXED HYPERLIPIDEMIA: ICD-10-CM

## 2023-09-21 RX ORDER — CELECOXIB 200 MG/1
200 CAPSULE ORAL DAILY
Qty: 90 CAPSULE | Refills: 1 | OUTPATIENT
Start: 2023-09-21

## 2023-09-21 RX ORDER — SIMVASTATIN 20 MG
20 TABLET ORAL NIGHTLY
Qty: 90 TABLET | Refills: 1 | OUTPATIENT
Start: 2023-09-21

## 2023-10-26 ENCOUNTER — OFFICE VISIT (OUTPATIENT)
Dept: FAMILY MEDICINE CLINIC | Facility: CLINIC | Age: 68
End: 2023-10-26
Payer: MEDICARE

## 2023-10-26 VITALS
HEART RATE: 56 BPM | OXYGEN SATURATION: 96 % | BODY MASS INDEX: 27.74 KG/M2 | TEMPERATURE: 97.3 F | WEIGHT: 183 LBS | SYSTOLIC BLOOD PRESSURE: 110 MMHG | HEIGHT: 68 IN | DIASTOLIC BLOOD PRESSURE: 70 MMHG

## 2023-10-26 DIAGNOSIS — Z23 NEED FOR INFLUENZA VACCINATION: ICD-10-CM

## 2023-10-26 DIAGNOSIS — F32.A DEPRESSION, UNSPECIFIED DEPRESSION TYPE: ICD-10-CM

## 2023-10-26 DIAGNOSIS — Z72.0 TOBACCO USE: ICD-10-CM

## 2023-10-26 DIAGNOSIS — M19.90 ARTHRITIS: ICD-10-CM

## 2023-10-26 DIAGNOSIS — I10 PRIMARY HYPERTENSION: ICD-10-CM

## 2023-10-26 DIAGNOSIS — E78.2 MIXED HYPERLIPIDEMIA: ICD-10-CM

## 2023-10-26 PROBLEM — K46.9 ABDOMINAL HERNIA: Status: ACTIVE | Noted: 2023-10-26

## 2023-10-26 PROBLEM — K64.9 HEMORRHOIDS: Status: ACTIVE | Noted: 2023-10-26

## 2023-10-26 PROBLEM — E66.9 OBESITY (BMI 30.0-34.9): Status: RESOLVED | Noted: 2022-03-17 | Resolved: 2023-10-26

## 2023-10-26 PROBLEM — E66.811 OBESITY (BMI 30.0-34.9): Status: RESOLVED | Noted: 2022-03-17 | Resolved: 2023-10-26

## 2023-10-26 PROBLEM — D64.9 ANEMIA: Status: ACTIVE | Noted: 2023-10-26

## 2023-10-26 PROBLEM — E07.9 THYROID DISEASE: Status: ACTIVE | Noted: 2023-10-26

## 2023-10-26 PROBLEM — K21.9 ACID REFLUX: Status: ACTIVE | Noted: 2023-10-26

## 2023-10-26 LAB
ALBUMIN SERPL-MCNC: 4.2 G/DL (ref 3.5–5.2)
ALBUMIN/GLOB SERPL: 1.8 G/DL
ALP SERPL-CCNC: 64 U/L (ref 39–117)
ALT SERPL W P-5'-P-CCNC: 17 U/L (ref 1–41)
ANION GAP SERPL CALCULATED.3IONS-SCNC: 11.1 MMOL/L (ref 5–15)
AST SERPL-CCNC: 20 U/L (ref 1–40)
BASOPHILS # BLD AUTO: 0.1 10*3/MM3 (ref 0–0.2)
BASOPHILS NFR BLD AUTO: 1.2 % (ref 0–1.5)
BILIRUB SERPL-MCNC: 0.4 MG/DL (ref 0–1.2)
BUN SERPL-MCNC: 14 MG/DL (ref 8–23)
BUN/CREAT SERPL: 16.3 (ref 7–25)
CALCIUM SPEC-SCNC: 9.1 MG/DL (ref 8.6–10.5)
CHLORIDE SERPL-SCNC: 106 MMOL/L (ref 98–107)
CHOLEST SERPL-MCNC: 110 MG/DL (ref 0–200)
CO2 SERPL-SCNC: 23.9 MMOL/L (ref 22–29)
CREAT SERPL-MCNC: 0.86 MG/DL (ref 0.76–1.27)
DEPRECATED RDW RBC AUTO: 42.2 FL (ref 37–54)
EGFRCR SERPLBLD CKD-EPI 2021: 94.9 ML/MIN/1.73
EOSINOPHIL # BLD AUTO: 0.27 10*3/MM3 (ref 0–0.4)
EOSINOPHIL NFR BLD AUTO: 3.3 % (ref 0.3–6.2)
ERYTHROCYTE [DISTWIDTH] IN BLOOD BY AUTOMATED COUNT: 11.9 % (ref 12.3–15.4)
GLOBULIN UR ELPH-MCNC: 2.4 GM/DL
GLUCOSE SERPL-MCNC: 81 MG/DL (ref 65–99)
HCT VFR BLD AUTO: 49.2 % (ref 37.5–51)
HDLC SERPL-MCNC: 44 MG/DL (ref 40–60)
HGB BLD-MCNC: 16.7 G/DL (ref 13–17.7)
IMM GRANULOCYTES # BLD AUTO: 0.03 10*3/MM3 (ref 0–0.05)
IMM GRANULOCYTES NFR BLD AUTO: 0.4 % (ref 0–0.5)
LDLC SERPL CALC-MCNC: 48 MG/DL (ref 0–100)
LDLC/HDLC SERPL: 1.08 {RATIO}
LYMPHOCYTES # BLD AUTO: 2.78 10*3/MM3 (ref 0.7–3.1)
LYMPHOCYTES NFR BLD AUTO: 33.9 % (ref 19.6–45.3)
MCH RBC QN AUTO: 33.1 PG (ref 26.6–33)
MCHC RBC AUTO-ENTMCNC: 33.9 G/DL (ref 31.5–35.7)
MCV RBC AUTO: 97.4 FL (ref 79–97)
MONOCYTES # BLD AUTO: 0.87 10*3/MM3 (ref 0.1–0.9)
MONOCYTES NFR BLD AUTO: 10.6 % (ref 5–12)
NEUTROPHILS NFR BLD AUTO: 4.15 10*3/MM3 (ref 1.7–7)
NEUTROPHILS NFR BLD AUTO: 50.6 % (ref 42.7–76)
NRBC BLD AUTO-RTO: 0 /100 WBC (ref 0–0.2)
PLATELET # BLD AUTO: 278 10*3/MM3 (ref 140–450)
PMV BLD AUTO: 10.1 FL (ref 6–12)
POTASSIUM SERPL-SCNC: 3.9 MMOL/L (ref 3.5–5.2)
PROT SERPL-MCNC: 6.6 G/DL (ref 6–8.5)
RBC # BLD AUTO: 5.05 10*6/MM3 (ref 4.14–5.8)
SODIUM SERPL-SCNC: 141 MMOL/L (ref 136–145)
T4 FREE SERPL-MCNC: 1.17 NG/DL (ref 0.93–1.7)
TRIGL SERPL-MCNC: 92 MG/DL (ref 0–150)
TSH SERPL DL<=0.05 MIU/L-ACNC: 0.38 UIU/ML (ref 0.27–4.2)
VLDLC SERPL-MCNC: 18 MG/DL (ref 5–40)
WBC NRBC COR # BLD: 8.2 10*3/MM3 (ref 3.4–10.8)

## 2023-10-26 PROCEDURE — 80053 COMPREHEN METABOLIC PANEL: CPT | Performed by: FAMILY MEDICINE

## 2023-10-26 PROCEDURE — 84439 ASSAY OF FREE THYROXINE: CPT | Performed by: FAMILY MEDICINE

## 2023-10-26 PROCEDURE — 80061 LIPID PANEL: CPT | Performed by: FAMILY MEDICINE

## 2023-10-26 PROCEDURE — 84443 ASSAY THYROID STIM HORMONE: CPT | Performed by: FAMILY MEDICINE

## 2023-10-26 PROCEDURE — 85025 COMPLETE CBC W/AUTO DIFF WBC: CPT | Performed by: FAMILY MEDICINE

## 2023-10-26 RX ORDER — LISINOPRIL 2.5 MG/1
2.5 TABLET ORAL DAILY
Qty: 90 TABLET | Refills: 1 | Status: SHIPPED | OUTPATIENT
Start: 2023-10-26

## 2023-10-26 RX ORDER — VENLAFAXINE HYDROCHLORIDE 37.5 MG/1
37.5 CAPSULE, EXTENDED RELEASE ORAL DAILY
Qty: 90 CAPSULE | Refills: 1 | Status: SHIPPED | OUTPATIENT
Start: 2023-10-26

## 2023-10-26 RX ORDER — CELECOXIB 200 MG/1
200 CAPSULE ORAL DAILY
Qty: 90 CAPSULE | Refills: 1 | Status: SHIPPED | OUTPATIENT
Start: 2023-10-26

## 2023-10-26 RX ORDER — SIMVASTATIN 20 MG
20 TABLET ORAL NIGHTLY
Qty: 90 TABLET | Refills: 1 | Status: SHIPPED | OUTPATIENT
Start: 2023-10-26

## 2023-10-26 NOTE — PROGRESS NOTES
Venipuncture Blood Specimen Collection  Venipuncture performed in LEFT ARM by Coty Ramírez with good hemostasis. Patient tolerated the procedure well without complications.   10/26/23   Coty Ramírez

## 2023-10-26 NOTE — PROGRESS NOTES
Chief Complaint  Depression, Hypertension, and Hyperlipidemia    Subjective          Santos Smith presents to [facility]  History of Present Illness  Pt almost weaned down on cigarettes- down to 2    Pt's depression controlled on med- pt tolerating meds well  Depression  Visit Type: follow-up  Patient is not experiencing: anhedonia, chest pain, choking sensation, compulsions, confusion, decreased concentration, depressed mood, dizziness, dry mouth, excessive worry, fatigue, feelings of hopelessness, feelings of worthlessness, hypersomnia, hyperventilation, insomnia, irritability, malaise, memory impairment, muscle tension, nausea, nervousness/anxiety, obsessions, palpitations, panic, psychomotor agitation, psychomotor retardation, restlessness, shortness of breath, suicidal ideas, suicidal planning, thoughts of death, weight gain and weight loss.  Frequency of symptoms: occasionally   Severity: moderate   Sleep quality: good  Nighttime awakenings: none  Compliance with medications:  %  Treatment side effects: no side effects.  Hypertension  This is a chronic problem. The current episode started more than 1 year ago. The problem has been gradually improving since onset. The problem is controlled. Pertinent negatives include no anxiety, blurred vision, chest pain, headaches, malaise/fatigue, neck pain, orthopnea, palpitations, peripheral edema, PND, shortness of breath or sweats. Risk factors for coronary artery disease include male gender, dyslipidemia, smoking/tobacco exposure and family history. Current antihypertension treatment includes ACE inhibitors. The current treatment provides significant improvement. There are no compliance problems.    Hyperlipidemia  This is a chronic problem. The current episode started more than 1 year ago. The problem is controlled. Recent lipid tests were reviewed and are variable. There are no known factors aggravating his hyperlipidemia.  Pertinent negatives include no chest pain, focal sensory loss, focal weakness, leg pain, myalgias or shortness of breath. Current antihyperlipidemic treatment includes statins. The current treatment provides significant improvement of lipids. There are no compliance problems.                 Objective   Allergies   Allergen Reactions    Prednisone Arrhythmia     Immunization History   Administered Date(s) Administered    COVID-19 (MODERNA) 1st,2nd,3rd Dose Monovalent 03/10/2021, 04/07/2021, 11/08/2021    COVID-19 (MODERNA) BIVALENT 12+YRS 03/30/2023    COVID-19 (MODERNA) Monovalent Original Booster 06/23/2022    Flu Vaccine Quad PF >36MO 10/13/2020    Fluad Quad 65+ 10/26/2021    Fluzone (or Fluarix & Flulaval for VFC) >6mos 10/13/2020    Fluzone High-Dose 65+yrs 09/21/2022, 10/26/2023    Influenza, Unspecified 10/15/2019    Pneumococcal Conjugate 13-Valent (PCV13) 01/27/2017    Pneumococcal Conjugate 20-Valent (PCV20) 10/13/2022    Pneumococcal Polysaccharide (PPSV23) 08/23/2017    Shingrix 06/23/2022, 10/13/2022    Tdap 03/30/2023     Past Medical History:   Diagnosis Date    Acid reflux     Allergic rhinitis, seasonal     Anemia     Condition not found     HERNIA    Hemorrhoids     Thyroid disease       Past Surgical History:   Procedure Laterality Date    COLONOSCOPY        Social History     Socioeconomic History    Marital status:    Tobacco Use    Smoking status: Every Day     Packs/day: 0.75     Years: 20.00     Additional pack years: 0.00     Total pack years: 15.00     Types: Cigarettes    Smokeless tobacco: Never    Tobacco comments:     2 cigs a day   Vaping Use    Vaping Use: Never used   Substance and Sexual Activity    Alcohol use: Yes     Comment: occ    Drug use: Never    Sexual activity: Defer        Current Outpatient Medications:     celecoxib (CeleBREX) 200 MG capsule, Take 1 capsule by mouth Daily., Disp: 90 capsule, Rfl: 1    simvastatin (ZOCOR) 20 MG tablet, Take 1 tablet by mouth  "Every Night., Disp: 90 tablet, Rfl: 1    venlafaxine XR (Effexor XR) 37.5 MG 24 hr capsule, Take 1 capsule by mouth Daily., Disp: 90 capsule, Rfl: 1    lisinopril (Zestril) 2.5 MG tablet, Take 1 tablet by mouth Daily., Disp: 90 tablet, Rfl: 1   Family History   Problem Relation Age of Onset    Cancer Father          AT 78; BROTHER OR SISTER NOT SPECIFIED-  AT AGE 23          Vital Signs:   Vitals:    10/26/23 0906   BP: 110/70   BP Location: Right arm   Patient Position: Sitting   Cuff Size: Adult   Pulse: 56   Temp: 97.3 °F (36.3 °C)   SpO2: 96%   Weight: 83 kg (183 lb)   Height: 172.7 cm (68\")       Review of Systems   Constitutional:  Negative for fatigue, irritability, malaise/fatigue, weight gain and weight loss.   HENT:  Negative for sore throat.    Eyes:  Negative for blurred vision and visual disturbance.   Respiratory:  Negative for cough, choking, chest tightness, shortness of breath and wheezing.    Cardiovascular:  Negative for chest pain, palpitations, orthopnea, leg swelling and PND.   Gastrointestinal:  Negative for abdominal pain, diarrhea, nausea and vomiting.   Musculoskeletal:  Negative for myalgias and neck pain.   Skin:  Negative for rash.   Neurological:  Negative for focal weakness, light-headedness and headaches.   Psychiatric/Behavioral:  Negative for confusion, decreased concentration, dysphoric mood, sleep disturbance and suicidal ideas. The patient is not nervous/anxious and does not have insomnia.       Physical Exam  Vitals reviewed.   Constitutional:       Appearance: Normal appearance. He is well-developed.   HENT:      Head: Normocephalic and atraumatic.      Right Ear: External ear normal.      Left Ear: External ear normal.      Mouth/Throat:      Pharynx: No oropharyngeal exudate.   Eyes:      Conjunctiva/sclera: Conjunctivae normal.      Pupils: Pupils are equal, round, and reactive to light.   Cardiovascular:      Rate and Rhythm: Normal rate and regular rhythm. "      Pulses: Normal pulses.      Heart sounds: Normal heart sounds. No murmur heard.     No friction rub. No gallop.   Pulmonary:      Effort: Pulmonary effort is normal.      Breath sounds: Normal breath sounds. No wheezing or rhonchi.   Abdominal:      General: Abdomen is flat. Bowel sounds are normal. There is no distension.      Palpations: Abdomen is soft. There is no mass.      Tenderness: There is no abdominal tenderness. There is no guarding or rebound.      Hernia: No hernia is present.   Musculoskeletal:         General: Normal range of motion.   Skin:     General: Skin is warm and dry.      Capillary Refill: Capillary refill takes less than 2 seconds.   Neurological:      General: No focal deficit present.      Mental Status: He is alert and oriented to person, place, and time.      Cranial Nerves: No cranial nerve deficit.   Psychiatric:         Mood and Affect: Mood and affect normal.         Behavior: Behavior normal.         Thought Content: Thought content normal.         Judgment: Judgment normal.        Result Review :   The following data was reviewed by: Jeffrey Mcmullen MD on 10/26/2023:  CMP          3/30/2023    09:00   CMP   Glucose 89    BUN 18    Creatinine 1.14    EGFR 70.9    Sodium 138    Potassium 5.0    Chloride 102    Calcium 9.9    Total Protein 7.7    Albumin 4.6    Globulin 3.1    Total Bilirubin 0.5    Alkaline Phosphatase 69    AST (SGOT) 26    ALT (SGPT) 22    Albumin/Globulin Ratio 1.5    BUN/Creatinine Ratio 15.8    Anion Gap 9.2      CBC          3/30/2023    09:00   CBC   WBC 8.59    RBC 5.09    Hemoglobin 16.2    Hematocrit 48.3    MCV 94.9    MCH 31.8    MCHC 33.5    RDW 12.1    Platelets 299      Lipid Panel          3/30/2023    09:00   Lipid Panel   Total Cholesterol 113    Triglycerides 69    HDL Cholesterol 46    VLDL Cholesterol 15    LDL Cholesterol  52    LDL/HDL Ratio 1.16      TSH          3/30/2023    09:00   TSH   TSH 0.421      PSA          3/30/2023    09:00    PSA   PSA 1.640                Assessment and Plan    Diagnoses and all orders for this visit:    1. BMI 27.0-27.9,adult (Primary)    2. Tobacco use    3. Need for influenza vaccination  -     Fluzone High-Dose 65+yrs    4. Arthritis  -     celecoxib (CeleBREX) 200 MG capsule; Take 1 capsule by mouth Daily.  Dispense: 90 capsule; Refill: 1    5. Mixed hyperlipidemia  -     simvastatin (ZOCOR) 20 MG tablet; Take 1 tablet by mouth Every Night.  Dispense: 90 tablet; Refill: 1    6. Depression, unspecified depression type  -     venlafaxine XR (Effexor XR) 37.5 MG 24 hr capsule; Take 1 capsule by mouth Daily.  Dispense: 90 capsule; Refill: 1    7. Primary hypertension  -     lisinopril (Zestril) 2.5 MG tablet; Take 1 tablet by mouth Daily.  Dispense: 90 tablet; Refill: 1  -     CBC Auto Differential  -     Comprehensive Metabolic Panel  -     Lipid Panel  -     TSH+Free T4            Follow Up   Return in about 6 months (around 4/26/2024) for Recheck, Medicare Wellness.  Patient was given instructions and counseling regarding his condition or for health maintenance advice. Please see specific information pulled into the AVS if appropriate.

## 2024-04-23 ENCOUNTER — OFFICE VISIT (OUTPATIENT)
Dept: FAMILY MEDICINE CLINIC | Facility: CLINIC | Age: 69
End: 2024-04-23
Payer: MEDICARE

## 2024-04-23 VITALS
BODY MASS INDEX: 26.89 KG/M2 | WEIGHT: 177.4 LBS | SYSTOLIC BLOOD PRESSURE: 138 MMHG | TEMPERATURE: 97.3 F | DIASTOLIC BLOOD PRESSURE: 70 MMHG | HEIGHT: 68 IN | HEART RATE: 74 BPM | OXYGEN SATURATION: 95 %

## 2024-04-23 DIAGNOSIS — Z12.5 SCREENING PSA (PROSTATE SPECIFIC ANTIGEN): ICD-10-CM

## 2024-04-23 DIAGNOSIS — Z00.00 MEDICARE ANNUAL WELLNESS VISIT, SUBSEQUENT: Primary | ICD-10-CM

## 2024-04-23 DIAGNOSIS — E78.2 MIXED HYPERLIPIDEMIA: ICD-10-CM

## 2024-04-23 DIAGNOSIS — I10 PRIMARY HYPERTENSION: ICD-10-CM

## 2024-04-23 LAB
ALBUMIN SERPL-MCNC: 4.1 G/DL (ref 3.5–5.2)
ALBUMIN/GLOB SERPL: 1.5 G/DL
ALP SERPL-CCNC: 71 U/L (ref 39–117)
ALT SERPL W P-5'-P-CCNC: 15 U/L (ref 1–41)
ANION GAP SERPL CALCULATED.3IONS-SCNC: 10.3 MMOL/L (ref 5–15)
AST SERPL-CCNC: 21 U/L (ref 1–40)
BASOPHILS # BLD AUTO: 0.08 10*3/MM3 (ref 0–0.2)
BASOPHILS NFR BLD AUTO: 0.9 % (ref 0–1.5)
BILIRUB SERPL-MCNC: 0.5 MG/DL (ref 0–1.2)
BUN SERPL-MCNC: 16 MG/DL (ref 8–23)
BUN/CREAT SERPL: 18.2 (ref 7–25)
CALCIUM SPEC-SCNC: 9.2 MG/DL (ref 8.6–10.5)
CHLORIDE SERPL-SCNC: 105 MMOL/L (ref 98–107)
CHOLEST SERPL-MCNC: 103 MG/DL (ref 0–200)
CO2 SERPL-SCNC: 24.7 MMOL/L (ref 22–29)
CREAT SERPL-MCNC: 0.88 MG/DL (ref 0.76–1.27)
DEPRECATED RDW RBC AUTO: 41.1 FL (ref 37–54)
EGFRCR SERPLBLD CKD-EPI 2021: 94.2 ML/MIN/1.73
EOSINOPHIL # BLD AUTO: 0.26 10*3/MM3 (ref 0–0.4)
EOSINOPHIL NFR BLD AUTO: 3.1 % (ref 0.3–6.2)
ERYTHROCYTE [DISTWIDTH] IN BLOOD BY AUTOMATED COUNT: 11.8 % (ref 12.3–15.4)
GLOBULIN UR ELPH-MCNC: 2.8 GM/DL
GLUCOSE SERPL-MCNC: 98 MG/DL (ref 65–99)
HCT VFR BLD AUTO: 47.7 % (ref 37.5–51)
HDLC SERPL-MCNC: 46 MG/DL (ref 40–60)
HGB BLD-MCNC: 16.4 G/DL (ref 13–17.7)
IMM GRANULOCYTES # BLD AUTO: 0.02 10*3/MM3 (ref 0–0.05)
IMM GRANULOCYTES NFR BLD AUTO: 0.2 % (ref 0–0.5)
LDLC SERPL CALC-MCNC: 43 MG/DL (ref 0–100)
LDLC/HDLC SERPL: 0.98 {RATIO}
LYMPHOCYTES # BLD AUTO: 2.76 10*3/MM3 (ref 0.7–3.1)
LYMPHOCYTES NFR BLD AUTO: 32.5 % (ref 19.6–45.3)
MCH RBC QN AUTO: 33.1 PG (ref 26.6–33)
MCHC RBC AUTO-ENTMCNC: 34.4 G/DL (ref 31.5–35.7)
MCV RBC AUTO: 96.2 FL (ref 79–97)
MONOCYTES # BLD AUTO: 0.89 10*3/MM3 (ref 0.1–0.9)
MONOCYTES NFR BLD AUTO: 10.5 % (ref 5–12)
NEUTROPHILS NFR BLD AUTO: 4.47 10*3/MM3 (ref 1.7–7)
NEUTROPHILS NFR BLD AUTO: 52.8 % (ref 42.7–76)
NRBC BLD AUTO-RTO: 0 /100 WBC (ref 0–0.2)
PLATELET # BLD AUTO: 286 10*3/MM3 (ref 140–450)
PMV BLD AUTO: 9.8 FL (ref 6–12)
POTASSIUM SERPL-SCNC: 4.3 MMOL/L (ref 3.5–5.2)
PROT SERPL-MCNC: 6.9 G/DL (ref 6–8.5)
PSA SERPL-MCNC: 3.73 NG/ML (ref 0–4)
RBC # BLD AUTO: 4.96 10*6/MM3 (ref 4.14–5.8)
SODIUM SERPL-SCNC: 140 MMOL/L (ref 136–145)
T4 FREE SERPL-MCNC: 1.44 NG/DL (ref 0.93–1.7)
TRIGL SERPL-MCNC: 60 MG/DL (ref 0–150)
TSH SERPL DL<=0.05 MIU/L-ACNC: 0.3 UIU/ML (ref 0.27–4.2)
VLDLC SERPL-MCNC: 14 MG/DL (ref 5–40)
WBC NRBC COR # BLD AUTO: 8.48 10*3/MM3 (ref 3.4–10.8)

## 2024-04-23 PROCEDURE — 36415 COLL VENOUS BLD VENIPUNCTURE: CPT | Performed by: FAMILY MEDICINE

## 2024-04-23 PROCEDURE — 80053 COMPREHEN METABOLIC PANEL: CPT | Performed by: FAMILY MEDICINE

## 2024-04-23 PROCEDURE — 1170F FXNL STATUS ASSESSED: CPT | Performed by: FAMILY MEDICINE

## 2024-04-23 PROCEDURE — 96160 PT-FOCUSED HLTH RISK ASSMT: CPT | Performed by: FAMILY MEDICINE

## 2024-04-23 PROCEDURE — 84439 ASSAY OF FREE THYROXINE: CPT | Performed by: FAMILY MEDICINE

## 2024-04-23 PROCEDURE — 1160F RVW MEDS BY RX/DR IN RCRD: CPT | Performed by: FAMILY MEDICINE

## 2024-04-23 PROCEDURE — 85025 COMPLETE CBC W/AUTO DIFF WBC: CPT | Performed by: FAMILY MEDICINE

## 2024-04-23 PROCEDURE — G0103 PSA SCREENING: HCPCS | Performed by: FAMILY MEDICINE

## 2024-04-23 PROCEDURE — 1159F MED LIST DOCD IN RCRD: CPT | Performed by: FAMILY MEDICINE

## 2024-04-23 PROCEDURE — G0439 PPPS, SUBSEQ VISIT: HCPCS | Performed by: FAMILY MEDICINE

## 2024-04-23 PROCEDURE — 3075F SYST BP GE 130 - 139MM HG: CPT | Performed by: FAMILY MEDICINE

## 2024-04-23 PROCEDURE — 3078F DIAST BP <80 MM HG: CPT | Performed by: FAMILY MEDICINE

## 2024-04-23 PROCEDURE — 84443 ASSAY THYROID STIM HORMONE: CPT | Performed by: FAMILY MEDICINE

## 2024-04-23 PROCEDURE — 80061 LIPID PANEL: CPT | Performed by: FAMILY MEDICINE

## 2024-04-23 NOTE — PROGRESS NOTES
Venipuncture Blood Specimen Collection  Venipuncture performed in left arm by Coty Ramírez with good hemostasis. Patient tolerated the procedure well without complications.   04/23/24   Coty Ramírez

## 2024-04-23 NOTE — PROGRESS NOTES
The ABCs of the Annual Wellness Visit  Subsequent Medicare Wellness Visit    Subjective    Santos Smith is a 68 y.o. male who presents for a Subsequent Medicare Wellness Visit.    The following portions of the patient's history were reviewed and   updated as appropriate: He  has a past medical history of Acid reflux, Allergic rhinitis, seasonal, Anemia, Condition not found, Hemorrhoids, and Thyroid disease.  He does not have any pertinent problems on file.  He  has a past surgical history that includes Colonoscopy.  His family history includes Cancer in his father.  He  reports that he has been smoking cigarettes. He has a 15 pack-year smoking history. He has never used smokeless tobacco. He reports current alcohol use. He reports that he does not use drugs.  No current outpatient medications on file.     No current facility-administered medications for this visit.     Current Outpatient Medications on File Prior to Visit   Medication Sig    [DISCONTINUED] celecoxib (CeleBREX) 200 MG capsule Take 1 capsule by mouth Daily. (Patient taking differently: Take 1 capsule by mouth Daily. Hasn't been taking daily)    [DISCONTINUED] lisinopril (Zestril) 2.5 MG tablet Take 1 tablet by mouth Daily. (Patient taking differently: Take 1 tablet by mouth Daily. Has not been taking daily- makes him dizzy)    [DISCONTINUED] simvastatin (ZOCOR) 20 MG tablet Take 1 tablet by mouth Every Night. (Patient taking differently: Take 1 tablet by mouth Every Night. Hasn't been taking daily)    [DISCONTINUED] venlafaxine XR (Effexor XR) 37.5 MG 24 hr capsule Take 1 capsule by mouth Daily. (Patient not taking: Reported on 4/23/2024)     No current facility-administered medications on file prior to visit.     He is allergic to prednisone..    Compared to one year ago, the patient feels his physical   health is better.    Compared to one year ago, the patient feels his mental   health is better.    Recent Hospitalizations:  He was not admitted  to the hospital during the last year.       Current Medical Providers:  Patient Care Team:  Jeffrey Mcmullen MD as PCP - General (Family Medicine)  Ginna Barth APRN as Nurse Practitioner (Family Medicine)  Provider, No Known    Outpatient Medications Prior to Visit   Medication Sig Dispense Refill    celecoxib (CeleBREX) 200 MG capsule Take 1 capsule by mouth Daily. (Patient taking differently: Take 1 capsule by mouth Daily. Hasn't been taking daily) 90 capsule 1    lisinopril (Zestril) 2.5 MG tablet Take 1 tablet by mouth Daily. (Patient taking differently: Take 1 tablet by mouth Daily. Has not been taking daily- makes him dizzy) 90 tablet 1    simvastatin (ZOCOR) 20 MG tablet Take 1 tablet by mouth Every Night. (Patient taking differently: Take 1 tablet by mouth Every Night. Hasn't been taking daily) 90 tablet 1    venlafaxine XR (Effexor XR) 37.5 MG 24 hr capsule Take 1 capsule by mouth Daily. (Patient not taking: Reported on 4/23/2024) 90 capsule 1     No facility-administered medications prior to visit.       No opioid medication identified on active medication list. I have reviewed chart for other potential  high risk medication/s and harmful drug interactions in the elderly.        Aspirin is not on active medication list.  Aspirin use is not indicated based on review of current medical condition/s. Risk of harm outweighs potential benefits.  .    Patient Active Problem List   Diagnosis    Mixed hyperlipidemia    Primary hypertension    Arthritis    Abdominal hernia    Acid reflux    Anemia    Hemorrhoids    Thyroid disease    BMI 27.0-27.9,adult    Tobacco use    Depression     Advance Care Planning   Advance Care Planning     Advance Directive is not on file.  ACP discussion was held with the patient during this visit. Patient does not have an advance directive, information provided.     Objective    Vitals:    04/23/24 0818   BP: 138/70   Pulse: 74   Temp: 97.3 °F (36.3 °C)   SpO2: 95%  "  Weight: 80.5 kg (177 lb 6.4 oz)   Height: 172.7 cm (68\")     Estimated body mass index is 26.97 kg/m² as calculated from the following:    Height as of this encounter: 172.7 cm (68\").    Weight as of this encounter: 80.5 kg (177 lb 6.4 oz).    BMI is >= 25 and <30. (Overweight) The following options were offered after discussion;: exercise counseling/recommendations and nutrition counseling/recommendations      Does the patient have evidence of cognitive impairment? No          HEALTH RISK ASSESSMENT    Smoking Status:  Social History     Tobacco Use   Smoking Status Every Day    Current packs/day: 0.75    Average packs/day: 0.8 packs/day for 20.0 years (15.0 ttl pk-yrs)    Types: Cigarettes   Smokeless Tobacco Never   Tobacco Comments    8 cigs a week     Alcohol Consumption:  Social History     Substance and Sexual Activity   Alcohol Use Yes    Comment: occ     Fall Risk Screen:    [facility] Fall Risk Assessment was completed, and patient is at LOW risk for falls.Assessment completed on:2024    Depression Screenin/23/2024     8:00 AM   PHQ-2/PHQ-9 Depression Screening   Little Interest or Pleasure in Doing Things 0-->not at all   Feeling Down, Depressed or Hopeless 0-->not at all   PHQ-9: Brief Depression Severity Measure Score 0       Health Habits and Functional and Cognitive Screenin/23/2024     8:00 AM   Functional & Cognitive Status   Do you have difficulty preparing food and eating? No   Do you have difficulty bathing yourself, getting dressed or grooming yourself? No   Do you have difficulty using the toilet? No   Do you have difficulty moving around from place to place? No   Do you have trouble with steps or getting out of a bed or a chair? No   Current Diet Well Balanced Diet   Dental Exam Not up to date   Eye Exam Up to date   Exercise (times per week) 2 times per week   Current Exercises Include Other        Exercise Comment crossfit   Do you need help using the phone?  No   Are " you deaf or do you have serious difficulty hearing?  No   Do you need help to go to places out of walking distance? No   Do you need help shopping? No   Do you need help preparing meals?  No   Do you need help with housework?  No   Do you need help with laundry? No   Do you need help taking your medications? No   Do you need help managing money? No   Do you ever drive or ride in a car without wearing a seat belt? No   Have you felt unusual stress, anger or loneliness in the last month? No   Who do you live with? Alone   If you need help, do you have trouble finding someone available to you? No   Have you been bothered in the last four weeks by sexual problems? No   Do you have difficulty concentrating, remembering or making decisions? No       Age-appropriate Screening Schedule:  Refer to the list below for future screening recommendations based on patient's age, sex and/or medical conditions. Orders for these recommended tests are listed in the plan section. The patient has been provided with a written plan.    Health Maintenance   Topic Date Due    RSV Vaccine - Adults (1 - 1-dose 60+ series) Never done    COVID-19 Vaccine (6 - 2023-24 season) 09/01/2023    INFLUENZA VACCINE  08/01/2024    LIPID PANEL  10/26/2024    ANNUAL WELLNESS VISIT  04/23/2025    BMI FOLLOWUP  04/23/2025    COLORECTAL CANCER SCREENING  03/08/2026    TDAP/TD VACCINES (2 - Td or Tdap) 03/30/2033    HEPATITIS C SCREENING  Completed    Pneumococcal Vaccine 65+  Completed    AAA SCREEN (ONE-TIME)  Completed    ZOSTER VACCINE  Completed                  CMS Preventative Services Quick Reference  Risk Factors Identified During Encounter  Immunizations Discussed/Encouraged: RSV (Respiratory Syncytial Virus)  Inactivity/Sedentary: Patient was advised to exercise at least 150 minutes a week per CDC recommendations.  The above risks/problems have been discussed with the patient.  Pertinent information has been shared with the patient in the After Visit  "Summary.  An After Visit Summary and PPPS were made available to the patient.    Follow Up:   Next Medicare Wellness visit to be scheduled in 1 year.       Additional E&M Note during same encounter follows:  Patient has multiple medical problems which are significant and separately identifiable that require additional work above and beyond the Medicare Wellness Visit.      Chief Complaint  Medicare Wellness-subsequent, Hypertension, and Hyperlipidemia    Subjective        Hypertension  Pertinent negatives include no chest pain, headaches, palpitations or shortness of breath.   Hyperlipidemia  Pertinent negatives include no chest pain or shortness of breath.         Review of Systems   Constitutional:  Negative for fatigue and fever.   HENT:  Negative for sore throat.    Eyes:  Negative for visual disturbance.   Respiratory:  Negative for cough, chest tightness, shortness of breath and wheezing.    Cardiovascular:  Negative for chest pain, palpitations and leg swelling.   Gastrointestinal:  Negative for abdominal pain, diarrhea, nausea and vomiting.   Neurological:  Negative for light-headedness.   Psychiatric/Behavioral:  Negative for decreased concentration, dysphoric mood, sleep disturbance and suicidal ideas. The patient is not nervous/anxious.        Objective   Vital Signs:  /70   Pulse 74   Temp 97.3 °F (36.3 °C)   Ht 172.7 cm (68\")   Wt 80.5 kg (177 lb 6.4 oz)   SpO2 95%   BMI 26.97 kg/m²     Physical Exam  Vitals reviewed.   Constitutional:       Appearance: Normal appearance. He is well-developed.   HENT:      Head: Normocephalic and atraumatic.      Right Ear: Ear canal and external ear normal.      Left Ear: Tympanic membrane, ear canal and external ear normal.      Ears:      Comments: Right TM slightly cloudy, no redness, still has pretty good light reflex.     Nose: Nose normal.      Mouth/Throat:      Mouth: Mucous membranes are moist.      Pharynx: Oropharynx is clear. No oropharyngeal " exudate or posterior oropharyngeal erythema.   Eyes:      Conjunctiva/sclera: Conjunctivae normal.      Pupils: Pupils are equal, round, and reactive to light.   Cardiovascular:      Rate and Rhythm: Normal rate and regular rhythm.      Pulses: Normal pulses.      Heart sounds: Normal heart sounds. No murmur heard.     No friction rub. No gallop.   Pulmonary:      Effort: Pulmonary effort is normal.      Breath sounds: Normal breath sounds. No wheezing or rhonchi.   Abdominal:      General: Abdomen is flat. Bowel sounds are normal. There is no distension.      Palpations: Abdomen is soft. There is no mass.      Tenderness: There is no abdominal tenderness. There is no guarding or rebound.      Hernia: No hernia is present.   Musculoskeletal:         General: Normal range of motion.      Cervical back: Normal range of motion and neck supple.   Skin:     General: Skin is warm and dry.      Capillary Refill: Capillary refill takes less than 2 seconds.   Neurological:      General: No focal deficit present.      Mental Status: He is alert and oriented to person, place, and time.      Cranial Nerves: No cranial nerve deficit.   Psychiatric:         Mood and Affect: Mood and affect normal.         Behavior: Behavior normal.         Thought Content: Thought content normal.         Judgment: Judgment normal.          The following data was reviewed by: Jeffrey Mcmullen MD on 04/23/2024:  CMP          10/26/2023    09:29   CMP   Glucose 81    BUN 14    Creatinine 0.86    EGFR 94.9    Sodium 141    Potassium 3.9    Chloride 106    Calcium 9.1    Total Protein 6.6    Albumin 4.2    Globulin 2.4    Total Bilirubin 0.4    Alkaline Phosphatase 64    AST (SGOT) 20    ALT (SGPT) 17    Albumin/Globulin Ratio 1.8    BUN/Creatinine Ratio 16.3    Anion Gap 11.1      CBC          10/26/2023    09:29   CBC   WBC 8.20    RBC 5.05    Hemoglobin 16.7    Hematocrit 49.2    MCV 97.4    MCH 33.1    MCHC 33.9    RDW 11.9    Platelets 278       Lipid Panel          10/26/2023    09:29   Lipid Panel   Total Cholesterol 110    Triglycerides 92    HDL Cholesterol 44    VLDL Cholesterol 18    LDL Cholesterol  48    LDL/HDL Ratio 1.08      TSH          10/26/2023    09:29   TSH   TSH 0.377                   Assessment and Plan   Diagnoses and all orders for this visit:    1. Medicare annual wellness visit, subsequent (Primary)    2. Primary hypertension  -     CBC Auto Differential  -     Comprehensive Metabolic Panel  -     TSH+Free T4    3. Mixed hyperlipidemia  -     Lipid Panel    4. Screening PSA (prostate specific antigen)  -     PSA Screen             Follow Up   Return in about 6 months (around 10/23/2024) for Recheck.  Patient was given instructions and counseling regarding his condition or for health maintenance advice. Please see specific information pulled into the AVS if appropriate.     Pt will try xyzal for 2 weeks- if ear does not heal or it worsens, pt will call us and will send out antibiotic.

## 2024-06-18 ENCOUNTER — HOSPITAL ENCOUNTER (OUTPATIENT)
Dept: CT IMAGING | Facility: HOSPITAL | Age: 69
Discharge: HOME | End: 2024-06-18
Admitting: FAMILY MEDICINE
Payer: MEDICARE

## 2024-06-18 DIAGNOSIS — R91.1 LUNG NODULE: ICD-10-CM

## 2024-06-18 PROCEDURE — 71250 CT THORAX DX C-: CPT

## 2024-12-13 ENCOUNTER — OFFICE VISIT (OUTPATIENT)
Dept: FAMILY MEDICINE CLINIC | Facility: CLINIC | Age: 69
End: 2024-12-13
Payer: MEDICARE

## 2024-12-13 VITALS
WEIGHT: 180.8 LBS | DIASTOLIC BLOOD PRESSURE: 70 MMHG | TEMPERATURE: 97.9 F | HEART RATE: 51 BPM | SYSTOLIC BLOOD PRESSURE: 130 MMHG | OXYGEN SATURATION: 95 % | BODY MASS INDEX: 27.49 KG/M2

## 2024-12-13 DIAGNOSIS — I10 PRIMARY HYPERTENSION: Primary | ICD-10-CM

## 2024-12-13 DIAGNOSIS — E78.2 MIXED HYPERLIPIDEMIA: ICD-10-CM

## 2024-12-13 LAB
ALBUMIN SERPL-MCNC: 4.2 G/DL (ref 3.5–5.2)
ALBUMIN/GLOB SERPL: 1.4 G/DL
ALP SERPL-CCNC: 88 U/L (ref 39–117)
ALT SERPL W P-5'-P-CCNC: 17 U/L (ref 1–41)
ANION GAP SERPL CALCULATED.3IONS-SCNC: 6.5 MMOL/L (ref 5–15)
AST SERPL-CCNC: 24 U/L (ref 1–40)
BASOPHILS # BLD AUTO: 0.1 10*3/MM3 (ref 0–0.2)
BASOPHILS NFR BLD AUTO: 0.9 % (ref 0–1.5)
BILIRUB SERPL-MCNC: 0.5 MG/DL (ref 0–1.2)
BUN SERPL-MCNC: 13 MG/DL (ref 8–23)
BUN/CREAT SERPL: 13.7 (ref 7–25)
CALCIUM SPEC-SCNC: 9.4 MG/DL (ref 8.6–10.5)
CHLORIDE SERPL-SCNC: 105 MMOL/L (ref 98–107)
CHOLEST SERPL-MCNC: 145 MG/DL (ref 0–200)
CO2 SERPL-SCNC: 25.5 MMOL/L (ref 22–29)
CREAT SERPL-MCNC: 0.95 MG/DL (ref 0.76–1.27)
DEPRECATED RDW RBC AUTO: 43.8 FL (ref 37–54)
EGFRCR SERPLBLD CKD-EPI 2021: 87.2 ML/MIN/1.73
EOSINOPHIL # BLD AUTO: 0.29 10*3/MM3 (ref 0–0.4)
EOSINOPHIL NFR BLD AUTO: 2.7 % (ref 0.3–6.2)
ERYTHROCYTE [DISTWIDTH] IN BLOOD BY AUTOMATED COUNT: 12.7 % (ref 12.3–15.4)
GLOBULIN UR ELPH-MCNC: 3.1 GM/DL
GLUCOSE SERPL-MCNC: 100 MG/DL (ref 65–99)
HCT VFR BLD AUTO: 47.3 % (ref 37.5–51)
HDLC SERPL-MCNC: 47 MG/DL (ref 40–60)
HGB BLD-MCNC: 16.5 G/DL (ref 13–17.7)
IMM GRANULOCYTES # BLD AUTO: 0.03 10*3/MM3 (ref 0–0.05)
IMM GRANULOCYTES NFR BLD AUTO: 0.3 % (ref 0–0.5)
LDLC SERPL CALC-MCNC: 77 MG/DL (ref 0–100)
LDLC/HDLC SERPL: 1.58 {RATIO}
LYMPHOCYTES # BLD AUTO: 3.08 10*3/MM3 (ref 0.7–3.1)
LYMPHOCYTES NFR BLD AUTO: 28.7 % (ref 19.6–45.3)
MCH RBC QN AUTO: 33.3 PG (ref 26.6–33)
MCHC RBC AUTO-ENTMCNC: 34.9 G/DL (ref 31.5–35.7)
MCV RBC AUTO: 95.4 FL (ref 79–97)
MONOCYTES # BLD AUTO: 1.27 10*3/MM3 (ref 0.1–0.9)
MONOCYTES NFR BLD AUTO: 11.8 % (ref 5–12)
NEUTROPHILS NFR BLD AUTO: 5.97 10*3/MM3 (ref 1.7–7)
NEUTROPHILS NFR BLD AUTO: 55.6 % (ref 42.7–76)
NRBC BLD AUTO-RTO: 0 /100 WBC (ref 0–0.2)
PLATELET # BLD AUTO: 281 10*3/MM3 (ref 140–450)
PMV BLD AUTO: 9.9 FL (ref 6–12)
POTASSIUM SERPL-SCNC: 5.3 MMOL/L (ref 3.5–5.2)
PROT SERPL-MCNC: 7.3 G/DL (ref 6–8.5)
RBC # BLD AUTO: 4.96 10*6/MM3 (ref 4.14–5.8)
SODIUM SERPL-SCNC: 137 MMOL/L (ref 136–145)
T4 FREE SERPL-MCNC: 1.23 NG/DL (ref 0.92–1.68)
TRIGL SERPL-MCNC: 118 MG/DL (ref 0–150)
TSH SERPL DL<=0.05 MIU/L-ACNC: 0.66 UIU/ML (ref 0.27–4.2)
VLDLC SERPL-MCNC: 21 MG/DL (ref 5–40)
WBC NRBC COR # BLD AUTO: 10.74 10*3/MM3 (ref 3.4–10.8)

## 2024-12-13 PROCEDURE — 80053 COMPREHEN METABOLIC PANEL: CPT | Performed by: FAMILY MEDICINE

## 2024-12-13 PROCEDURE — 84443 ASSAY THYROID STIM HORMONE: CPT | Performed by: FAMILY MEDICINE

## 2024-12-13 PROCEDURE — 84439 ASSAY OF FREE THYROXINE: CPT | Performed by: FAMILY MEDICINE

## 2024-12-13 PROCEDURE — 85025 COMPLETE CBC W/AUTO DIFF WBC: CPT | Performed by: FAMILY MEDICINE

## 2024-12-13 PROCEDURE — 80061 LIPID PANEL: CPT | Performed by: FAMILY MEDICINE

## 2024-12-13 NOTE — PROGRESS NOTES
Venipuncture Blood Specimen Collection  Venipuncture performed in left arm by Merari Garcia MA with good hemostasis. Patient tolerated the procedure well without complications.   12/13/24   Connie Medina MA

## 2024-12-13 NOTE — PROGRESS NOTES
Chief Complaint  Hypothyroidism and Hyperlipidemia    Subjective          Santos Smith presents to [facility]  Hypertension  This is a chronic problem. The current episode started more than 1 year ago. The problem has been improved since onset. The problem is controlled. Pertinent negatives include no anxiety, blurred vision, chest pain, headaches, malaise/fatigue, neck pain, orthopnea, palpitations, peripheral edema, PND, shortness of breath or sweats. There are no associated agents to hypertension. Risk factors for coronary artery disease include dyslipidemia, family history and male gender. Current antihypertension treatment includes lifestyle changes. The current treatment provides significant improvement. There are no compliance problems.    Hyperlipidemia  This is a chronic problem. The current episode started more than 1 year ago. The problem is controlled. Recent lipid tests were reviewed and are variable. There are no known factors aggravating his hyperlipidemia. Pertinent negatives include no chest pain, focal sensory loss, focal weakness, leg pain, myalgias or shortness of breath. Current antihyperlipidemic treatment includes diet change and exercise. The current treatment provides significant improvement of lipids. There are no compliance problems.                 Objective   Allergies   Allergen Reactions    Prednisone Arrhythmia     Immunization History   Administered Date(s) Administered    COVID-19 (MODERNA) 1st,2nd,3rd Dose Monovalent 03/10/2021, 04/07/2021, 11/08/2021    COVID-19 (MODERNA) BIVALENT 12+YRS 03/30/2023    COVID-19 (MODERNA) Monovalent Original Booster 06/23/2022    Flu Vaccine Quad PF >36MO 10/13/2020    Fluad Quad 65+ 10/26/2021    Fluzone (or Fluarix & Flulaval for VFC) >6mos 10/13/2020    Fluzone High-Dose 65+yrs 09/21/2022, 10/26/2023    Influenza, Unspecified 10/15/2019    Pneumococcal Conjugate 13-Valent (PCV13) 01/27/2017    Pneumococcal  Conjugate 20-Valent (PCV20) 10/13/2022    Pneumococcal Polysaccharide (PPSV23) 2017    Shingrix 2022, 10/13/2022    Tdap 2023     Past Medical History:   Diagnosis Date    Acid reflux     Allergic rhinitis, seasonal     Anemia     Condition not found     HERNIA    Hemorrhoids     Thyroid disease       Past Surgical History:   Procedure Laterality Date    COLONOSCOPY        Social History     Socioeconomic History    Marital status:    Tobacco Use    Smoking status: Former     Current packs/day: 0.00     Average packs/day: 0.8 packs/day for 53.6 years (40.2 ttl pk-yrs)     Types: Cigarettes     Start date:      Quit date: 2024     Years since quittin.3    Smokeless tobacco: Never    Tobacco comments:     8 cigs a week   Vaping Use    Vaping status: Never Used   Substance and Sexual Activity    Alcohol use: Yes     Comment: occ    Drug use: Never    Sexual activity: Defer      No current outpatient medications on file.   Family History   Problem Relation Age of Onset    Cancer Father          AT 78; BROTHER OR SISTER NOT SPECIFIED-  AT AGE 23          Vital Signs:   Vitals:    24 1045   BP: 130/70   Pulse: 51   Temp: 97.9 °F (36.6 °C)   SpO2: 95%   Weight: 82 kg (180 lb 12.8 oz)       Review of Systems   Constitutional:  Negative for fatigue, fever and malaise/fatigue.   HENT:  Negative for sore throat.    Eyes:  Negative for blurred vision and visual disturbance.   Respiratory:  Negative for cough, chest tightness, shortness of breath and wheezing.    Cardiovascular:  Negative for chest pain, palpitations, orthopnea, leg swelling and PND.   Gastrointestinal:  Negative for abdominal pain, diarrhea, nausea and vomiting.   Musculoskeletal:  Negative for myalgias and neck pain.   Skin:  Negative for rash.   Neurological:  Negative for dizziness, focal weakness, light-headedness and headaches.      Physical Exam  Vitals reviewed.   Constitutional:        Appearance: Normal appearance. He is well-developed.   HENT:      Head: Normocephalic and atraumatic.      Right Ear: External ear normal.      Left Ear: External ear normal.      Mouth/Throat:      Pharynx: No oropharyngeal exudate.   Eyes:      Conjunctiva/sclera: Conjunctivae normal.      Pupils: Pupils are equal, round, and reactive to light.   Cardiovascular:      Rate and Rhythm: Normal rate and regular rhythm.      Pulses: Normal pulses.      Heart sounds: Normal heart sounds. No murmur heard.     No friction rub. No gallop.   Pulmonary:      Effort: Pulmonary effort is normal.      Breath sounds: Normal breath sounds. No wheezing or rhonchi.   Abdominal:      General: Abdomen is flat. Bowel sounds are normal. There is no distension.      Palpations: Abdomen is soft. There is no mass.      Tenderness: There is no abdominal tenderness. There is no guarding or rebound.      Hernia: No hernia is present.   Musculoskeletal:         General: Normal range of motion.   Skin:     General: Skin is warm and dry.      Capillary Refill: Capillary refill takes less than 2 seconds.   Neurological:      General: No focal deficit present.      Mental Status: He is alert and oriented to person, place, and time.      Cranial Nerves: No cranial nerve deficit.   Psychiatric:         Mood and Affect: Mood and affect normal.         Behavior: Behavior normal.         Thought Content: Thought content normal.         Judgment: Judgment normal.        Result Review :   The following data was reviewed by: Jeffrey Mcmullen MD on 12/13/2024:  CMP          4/23/2024    09:17   CMP   Glucose 98    BUN 16    Creatinine 0.88    EGFR 94.2    Sodium 140    Potassium 4.3    Chloride 105    Calcium 9.2    Total Protein 6.9    Albumin 4.1    Globulin 2.8    Total Bilirubin 0.5    Alkaline Phosphatase 71    AST (SGOT) 21    ALT (SGPT) 15    Albumin/Globulin Ratio 1.5    BUN/Creatinine Ratio 18.2    Anion Gap 10.3      CBC          4/23/2024     09:17   CBC   WBC 8.48    RBC 4.96    Hemoglobin 16.4    Hematocrit 47.7    MCV 96.2    MCH 33.1    MCHC 34.4    RDW 11.8    Platelets 286      Lipid Panel          4/23/2024    09:17   Lipid Panel   Total Cholesterol 103    Triglycerides 60    HDL Cholesterol 46    VLDL Cholesterol 14    LDL Cholesterol  43    LDL/HDL Ratio 0.98      TSH          4/23/2024    09:17   TSH   TSH 0.299      PSA          4/23/2024    09:17   PSA   PSA 3.730                Assessment and Plan    Diagnoses and all orders for this visit:    1. Primary hypertension (Primary)  -     CBC Auto Differential  -     Comprehensive Metabolic Panel  -     Lipid Panel  -     TSH+Free T4    2. Mixed hyperlipidemia  -     Lipid Panel            Follow Up   Return in about 6 months (around 6/13/2025) for Recheck, Medicare Wellness.  Patient was given instructions and counseling regarding his condition or for health maintenance advice. Please see specific information pulled into the AVS if appropriate.

## 2024-12-17 DIAGNOSIS — E87.5 HYPERKALEMIA: Primary | ICD-10-CM

## 2024-12-26 ENCOUNTER — CLINICAL SUPPORT (OUTPATIENT)
Dept: FAMILY MEDICINE CLINIC | Facility: CLINIC | Age: 69
End: 2024-12-26
Payer: MEDICARE

## 2024-12-26 DIAGNOSIS — D64.9 ANEMIA, UNSPECIFIED TYPE: Primary | ICD-10-CM

## 2024-12-26 LAB
ANION GAP SERPL CALCULATED.3IONS-SCNC: 10.8 MMOL/L (ref 5–15)
BUN SERPL-MCNC: 15 MG/DL (ref 8–23)
BUN/CREAT SERPL: 16.1 (ref 7–25)
CALCIUM SPEC-SCNC: 9.4 MG/DL (ref 8.6–10.5)
CHLORIDE SERPL-SCNC: 104 MMOL/L (ref 98–107)
CO2 SERPL-SCNC: 25.2 MMOL/L (ref 22–29)
CREAT SERPL-MCNC: 0.93 MG/DL (ref 0.76–1.27)
EGFRCR SERPLBLD CKD-EPI 2021: 89.4 ML/MIN/1.73
GLUCOSE SERPL-MCNC: 121 MG/DL (ref 65–99)
POTASSIUM SERPL-SCNC: 4.1 MMOL/L (ref 3.5–5.2)
SODIUM SERPL-SCNC: 140 MMOL/L (ref 136–145)

## 2024-12-26 PROCEDURE — 36415 COLL VENOUS BLD VENIPUNCTURE: CPT | Performed by: FAMILY MEDICINE

## 2024-12-26 PROCEDURE — 80048 BASIC METABOLIC PNL TOTAL CA: CPT | Performed by: FAMILY MEDICINE

## 2024-12-26 NOTE — PROGRESS NOTES
Venipuncture Blood Specimen Collection  Venipuncture performed in [place] by Yamini Fitzpatrick MA with good hemostasis. Patient tolerated the procedure well without complications.   12/26/24   Yamini Fitzpatrick MA

## 2025-04-02 ENCOUNTER — OFFICE VISIT (OUTPATIENT)
Dept: FAMILY MEDICINE CLINIC | Facility: CLINIC | Age: 70
End: 2025-04-02
Payer: MEDICARE

## 2025-04-02 VITALS
HEART RATE: 52 BPM | HEIGHT: 68 IN | SYSTOLIC BLOOD PRESSURE: 132 MMHG | DIASTOLIC BLOOD PRESSURE: 84 MMHG | TEMPERATURE: 98 F | BODY MASS INDEX: 29.25 KG/M2 | WEIGHT: 193 LBS | OXYGEN SATURATION: 95 %

## 2025-04-02 DIAGNOSIS — H92.21 EAR BLEEDING, RIGHT: Primary | ICD-10-CM

## 2025-04-02 DIAGNOSIS — M62.838 SPASM OF RIGHT PIRIFORMIS MUSCLE: ICD-10-CM

## 2025-04-02 PROCEDURE — 99214 OFFICE O/P EST MOD 30 MIN: CPT | Performed by: STUDENT IN AN ORGANIZED HEALTH CARE EDUCATION/TRAINING PROGRAM

## 2025-04-02 PROCEDURE — 1160F RVW MEDS BY RX/DR IN RCRD: CPT | Performed by: STUDENT IN AN ORGANIZED HEALTH CARE EDUCATION/TRAINING PROGRAM

## 2025-04-02 PROCEDURE — 1159F MED LIST DOCD IN RCRD: CPT | Performed by: STUDENT IN AN ORGANIZED HEALTH CARE EDUCATION/TRAINING PROGRAM

## 2025-04-02 PROCEDURE — 3075F SYST BP GE 130 - 139MM HG: CPT | Performed by: STUDENT IN AN ORGANIZED HEALTH CARE EDUCATION/TRAINING PROGRAM

## 2025-04-02 PROCEDURE — 1126F AMNT PAIN NOTED NONE PRSNT: CPT | Performed by: STUDENT IN AN ORGANIZED HEALTH CARE EDUCATION/TRAINING PROGRAM

## 2025-04-02 PROCEDURE — 3079F DIAST BP 80-89 MM HG: CPT | Performed by: STUDENT IN AN ORGANIZED HEALTH CARE EDUCATION/TRAINING PROGRAM

## 2025-04-02 RX ORDER — METHOCARBAMOL 500 MG/1
500 TABLET, FILM COATED ORAL 4 TIMES DAILY
Qty: 45 TABLET | Refills: 1 | Status: SHIPPED | OUTPATIENT
Start: 2025-04-02

## 2025-04-02 RX ORDER — METHYLPREDNISOLONE 4 MG/1
TABLET ORAL
Qty: 1 EACH | Refills: 0 | Status: SHIPPED | OUTPATIENT
Start: 2025-04-02

## 2025-04-03 NOTE — PROGRESS NOTES
Chief Complaint  Ear Drainage (Bleeding in right ear for the last 2 days, no pain) and Leg Pain (Pain in right leg, feels like a jennifer horse, in hamstring, behind knee, and calf, the right foot feels numb, started about 4-5 weeks ago)    Subjective      Santos Smith is a 69 y.o. male who presents to [facility]       History of Present Illness  The patient is a 69-year-old male who presents for evaluation of bleeding from the ear and leg pain.    He reports no history of ear-related issues during his childhood. He accidentally cut his left ear while shaving this morning. He has been experiencing a sensation of fluid movement in his right ear for the past two nights, which he first noticed around 3:30 AM last night while sleeping on his side. Upon investigation with a Q-tip, he discovered blood. Approximately 1.5 weeks ago, he experienced a similar sensation of fluid in his ear, which subsequently resolved. He reports no hearing difficulties or injury to the ear. He also reports no presence of blood on his pillow.  He denies any injury to the right ear.  He has previously consulted with Dr. Janeth Nicole, an ENT specialist, approximately 5 years ago.    He has been experiencing leg pain for the past 5 weeks, initially attributing it to a potential injury from his thrice-weekly CrossFit workouts. The pain is localized to the lower part of his hamstring, behind the knee, and is accompanied by numbness in his foot. He describes the discomfort as similar to a charley horse or cramp. He also reports tingling sensations in his right foot. He experiences pain in his gluteal region when straining during bowel movements. He maintains an active lifestyle, including regular stretching and warm-up exercises. He has been attending CrossFit sessions for the past 19 months. He has a known allergy to certain steroids.          Objective   Vital Signs:   Vitals:    04/02/25 0931   BP: 132/84  "  Pulse: 52   Temp: 98 °F (36.7 °C)   SpO2: 95%   Weight: 87.5 kg (193 lb)   Height: 172.7 cm (68\")     Body mass index is 29.35 kg/m².    Wt Readings from Last 3 Encounters:   04/02/25 87.5 kg (193 lb)   12/13/24 82 kg (180 lb 12.8 oz)   04/23/24 80.5 kg (177 lb 6.4 oz)     BP Readings from Last 3 Encounters:   04/02/25 132/84   12/13/24 130/70   04/23/24 138/70       Health Maintenance   Topic Date Due    ANNUAL WELLNESS VISIT  04/23/2025    LUNG CANCER SCREENING  06/18/2025    COVID-19 Vaccine (6 - 2024-25 season) 04/02/2026 (Originally 9/1/2024)    INFLUENZA VACCINE  07/01/2025    LIPID PANEL  12/13/2025    COLORECTAL CANCER SCREENING  03/08/2026    TDAP/TD VACCINES (2 - Td or Tdap) 03/30/2033    HEPATITIS C SCREENING  Completed    Pneumococcal Vaccine 50+  Completed    AAA SCREEN ONCE  Completed    ZOSTER VACCINE  Completed       Physical Exam  HENT:      Ears:      Comments: Superficial cut noted to the antitragus of the left ear, TM normal on the left.  In the canal of the right ear there is a large amount of clotted blood, right external ear is normal.  Pulmonary:      Effort: Pulmonary effort is normal.   Musculoskeletal:         General: Normal range of motion.   Skin:     General: Skin is warm and dry.   Neurological:      General: No focal deficit present.      Mental Status: He is alert.          Result Review :  The following data was reviewed by: Emily Abdul DO on 04/02/2025:         Procedures          ASSESSMENT/PLAN  Diagnoses and all orders for this visit:    1. Ear bleeding, right (Primary)  -     Ambulatory Referral to ENT (Otolaryngology)    2. Spasm of right piriformis muscle  -     methylPREDNISolone (MEDROL) 4 MG dose pack; Take as directed on package instructions.  Dispense: 1 each; Refill: 0  -     methocarbamol (ROBAXIN) 500 MG tablet; Take 1 tablet by mouth 4 (Four) Times a Day.  Dispense: 45 tablet; Refill: 1          Assessment & Plan  1. Otorrhagia.  The etiology of the " bleeding remains uncertain, with no active hemorrhage observed during the examination. A referral to an Ear, Nose, and Throat (ENT) specialist, preferably Dr. Janeth Nicole, has been initiated for further evaluation and management.    2. Leg pain.  The symptoms suggest a possible muscle spasm or sciatica, likely due to a twisting motion. A prescription for methylprednisolone has been provided to manage the inflammation. He has been advised to perform specific stretching exercises targeting the affected muscle.            FOLLOW UP  Return if symptoms worsen or fail to improve.  Patient was given instructions and counseling regarding his condition or for health maintenance advice. Please see specific information pulled into the AVS if appropriate.       Emily Abdul DO  04/03/25  08:32 EDT    Patient or patient representative verbalized consent for the use of Ambient Listening during the visit with  Emily Abdul DO for chart documentation. 4/3/2025  08:32 EDT

## 2025-05-13 ENCOUNTER — OFFICE VISIT (OUTPATIENT)
Dept: ORTHOPEDIC SURGERY | Facility: CLINIC | Age: 70
End: 2025-05-13
Payer: MEDICARE

## 2025-05-13 ENCOUNTER — OFFICE VISIT (OUTPATIENT)
Dept: FAMILY MEDICINE CLINIC | Facility: CLINIC | Age: 70
End: 2025-05-13
Payer: MEDICARE

## 2025-05-13 VITALS — HEIGHT: 68 IN | BODY MASS INDEX: 29.7 KG/M2 | WEIGHT: 196 LBS | TEMPERATURE: 97.7 F

## 2025-05-13 VITALS
TEMPERATURE: 97.8 F | WEIGHT: 196.9 LBS | BODY MASS INDEX: 29.84 KG/M2 | SYSTOLIC BLOOD PRESSURE: 124 MMHG | OXYGEN SATURATION: 96 % | HEART RATE: 61 BPM | HEIGHT: 68 IN | DIASTOLIC BLOOD PRESSURE: 68 MMHG

## 2025-05-13 DIAGNOSIS — Z87.891 PERSONAL HISTORY OF NICOTINE DEPENDENCE: ICD-10-CM

## 2025-05-13 DIAGNOSIS — E78.2 MIXED HYPERLIPIDEMIA: ICD-10-CM

## 2025-05-13 DIAGNOSIS — M25.561 ACUTE PAIN OF RIGHT KNEE: Primary | ICD-10-CM

## 2025-05-13 DIAGNOSIS — Z12.2 SCREENING FOR LUNG CANCER: ICD-10-CM

## 2025-05-13 DIAGNOSIS — R73.9 ELEVATED RANDOM BLOOD GLUCOSE LEVEL: ICD-10-CM

## 2025-05-13 DIAGNOSIS — M25.361 KNEE INSTABILITY, RIGHT: ICD-10-CM

## 2025-05-13 DIAGNOSIS — Z12.5 SCREENING PSA (PROSTATE SPECIFIC ANTIGEN): ICD-10-CM

## 2025-05-13 DIAGNOSIS — I10 PRIMARY HYPERTENSION: ICD-10-CM

## 2025-05-13 LAB
ALBUMIN SERPL-MCNC: 4.1 G/DL (ref 3.5–5.2)
ALBUMIN/GLOB SERPL: 1.4 G/DL
ALP SERPL-CCNC: 78 U/L (ref 39–117)
ALT SERPL W P-5'-P-CCNC: 21 U/L (ref 1–41)
ANION GAP SERPL CALCULATED.3IONS-SCNC: 11.5 MMOL/L (ref 5–15)
AST SERPL-CCNC: 27 U/L (ref 1–40)
BASOPHILS # BLD AUTO: 0.08 10*3/MM3 (ref 0–0.2)
BASOPHILS NFR BLD AUTO: 1 % (ref 0–1.5)
BILIRUB SERPL-MCNC: 0.4 MG/DL (ref 0–1.2)
BUN SERPL-MCNC: 13 MG/DL (ref 8–23)
BUN/CREAT SERPL: 15.1 (ref 7–25)
CALCIUM SPEC-SCNC: 9.7 MG/DL (ref 8.6–10.5)
CHLORIDE SERPL-SCNC: 103 MMOL/L (ref 98–107)
CHOLEST SERPL-MCNC: 150 MG/DL (ref 0–200)
CO2 SERPL-SCNC: 24.5 MMOL/L (ref 22–29)
CREAT SERPL-MCNC: 0.86 MG/DL (ref 0.76–1.27)
DEPRECATED RDW RBC AUTO: 44.1 FL (ref 37–54)
EGFRCR SERPLBLD CKD-EPI 2021: 94.3 ML/MIN/1.73
EOSINOPHIL # BLD AUTO: 0.34 10*3/MM3 (ref 0–0.4)
EOSINOPHIL NFR BLD AUTO: 4.1 % (ref 0.3–6.2)
ERYTHROCYTE [DISTWIDTH] IN BLOOD BY AUTOMATED COUNT: 12.5 % (ref 12.3–15.4)
GLOBULIN UR ELPH-MCNC: 2.9 GM/DL
GLUCOSE SERPL-MCNC: 82 MG/DL (ref 65–99)
HBA1C MFR BLD: 5.3 % (ref 4.8–5.6)
HCT VFR BLD AUTO: 44.8 % (ref 37.5–51)
HDLC SERPL-MCNC: 47 MG/DL (ref 40–60)
HGB BLD-MCNC: 15.5 G/DL (ref 13–17.7)
IMM GRANULOCYTES # BLD AUTO: 0.05 10*3/MM3 (ref 0–0.05)
IMM GRANULOCYTES NFR BLD AUTO: 0.6 % (ref 0–0.5)
LDLC SERPL CALC-MCNC: 67 MG/DL (ref 0–100)
LDLC/HDLC SERPL: 1.26 {RATIO}
LYMPHOCYTES # BLD AUTO: 2.96 10*3/MM3 (ref 0.7–3.1)
LYMPHOCYTES NFR BLD AUTO: 35.3 % (ref 19.6–45.3)
MCH RBC QN AUTO: 33 PG (ref 26.6–33)
MCHC RBC AUTO-ENTMCNC: 34.6 G/DL (ref 31.5–35.7)
MCV RBC AUTO: 95.3 FL (ref 79–97)
MONOCYTES # BLD AUTO: 1 10*3/MM3 (ref 0.1–0.9)
MONOCYTES NFR BLD AUTO: 11.9 % (ref 5–12)
NEUTROPHILS NFR BLD AUTO: 3.96 10*3/MM3 (ref 1.7–7)
NEUTROPHILS NFR BLD AUTO: 47.1 % (ref 42.7–76)
NRBC BLD AUTO-RTO: 0 /100 WBC (ref 0–0.2)
PLATELET # BLD AUTO: 295 10*3/MM3 (ref 140–450)
PMV BLD AUTO: 9.9 FL (ref 6–12)
POTASSIUM SERPL-SCNC: 4.5 MMOL/L (ref 3.5–5.2)
PROT SERPL-MCNC: 7 G/DL (ref 6–8.5)
PSA SERPL-MCNC: 2.28 NG/ML (ref 0–4)
RBC # BLD AUTO: 4.7 10*6/MM3 (ref 4.14–5.8)
SODIUM SERPL-SCNC: 139 MMOL/L (ref 136–145)
T4 FREE SERPL-MCNC: 1.22 NG/DL (ref 0.92–1.68)
TRIGL SERPL-MCNC: 220 MG/DL (ref 0–150)
TSH SERPL DL<=0.05 MIU/L-ACNC: 0.55 UIU/ML (ref 0.27–4.2)
VLDLC SERPL-MCNC: 36 MG/DL (ref 5–40)
WBC NRBC COR # BLD AUTO: 8.39 10*3/MM3 (ref 3.4–10.8)

## 2025-05-13 PROCEDURE — 83036 HEMOGLOBIN GLYCOSYLATED A1C: CPT | Performed by: FAMILY MEDICINE

## 2025-05-13 PROCEDURE — 84439 ASSAY OF FREE THYROXINE: CPT | Performed by: FAMILY MEDICINE

## 2025-05-13 PROCEDURE — 84443 ASSAY THYROID STIM HORMONE: CPT | Performed by: FAMILY MEDICINE

## 2025-05-13 PROCEDURE — 80053 COMPREHEN METABOLIC PANEL: CPT | Performed by: FAMILY MEDICINE

## 2025-05-13 PROCEDURE — 99203 OFFICE O/P NEW LOW 30 MIN: CPT | Performed by: NURSE PRACTITIONER

## 2025-05-13 PROCEDURE — G0103 PSA SCREENING: HCPCS | Performed by: FAMILY MEDICINE

## 2025-05-13 PROCEDURE — 85025 COMPLETE CBC W/AUTO DIFF WBC: CPT | Performed by: FAMILY MEDICINE

## 2025-05-13 PROCEDURE — 80061 LIPID PANEL: CPT | Performed by: FAMILY MEDICINE

## 2025-05-13 RX ORDER — SACCHAROMYCES BOULARDII 250 MG
250 CAPSULE ORAL 2 TIMES DAILY
Qty: 20 CAPSULE | Refills: 0 | Status: SHIPPED | OUTPATIENT
Start: 2025-05-13 | End: 2025-05-23

## 2025-05-13 RX ORDER — DOXYCYCLINE 100 MG/1
100 CAPSULE ORAL 2 TIMES DAILY
Qty: 14 CAPSULE | Refills: 0 | Status: SHIPPED | OUTPATIENT
Start: 2025-05-13 | End: 2025-05-20

## 2025-05-13 NOTE — ASSESSMENT & PLAN NOTE
Hypertension is stable and controlled  Continue current treatment regimen.  Blood pressure will be reassessed in 6 months.    Orders:    CBC Auto Differential    Comprehensive Metabolic Panel    TSH+Free T4

## 2025-05-13 NOTE — PROGRESS NOTES
Patient: Santos Smith  YOB: 1955 69 y.o. male  Medical Record Number: [id number]    Chief Complaints:   Chief Complaint   Patient presents with    Right Knee - Initial Evaluation, Pain       History of Present Illness:Santos Smith is a 69 y.o. male who presents as a new patient to our practice with complaints of having right knee pain that is acute in nature.  Patient states his symptoms began 2 weeks ago after doing a CrossFit workout session.  Patient states that he woke up with pain stiffness and swelling globally about his knee.  Patient reports the pain is located to the anterior medial and posterior aspects of his knee.  Describes it as a constant severe ache and sometimes sharp stabbing pain.  Worse with flexion and certain positions or movements.  Rates his pain level as a 7 on a scale of 10.  He has been trying to treat his issues conservatively with Tylenol and ibuprofen with minimal relief.  He is also been icing elevating and using warm and cold compresses.  Minimal relief for these issues as well.  Patient reports that his knee feels like it is it wants to give out on him occasionally.  He does report that he has had a previous meniscus tear in this knee that has been previously scoped by Dr. Tomas.  These are all new symptoms since this incident.  He is without any other significant complaints today.    Allergies:   Allergies   Allergen Reactions    Prednisone Arrhythmia       Medications:   Current Outpatient Medications   Medication Sig Dispense Refill    diclofenac (VOLTAREN) 50 MG EC tablet Take 1 tablet by mouth 2 (Two) Times a Day As Needed (knee pain). 60 tablet 0    doxycycline (VIBRAMYCIN) 100 MG capsule Take 1 capsule by mouth 2 (Two) Times a Day for 7 days. 14 capsule 0    methocarbamol (ROBAXIN) 500 MG tablet Take 1 tablet by mouth 4 (Four) Times a Day. 45 tablet 1    saccharomyces boulardii (Florastor) 250 MG capsule Take 1 capsule by mouth 2 (Two) Times a Day for 10  "days. 20 capsule 0     No current facility-administered medications for this visit.         The following portions of the patient's history were reviewed and updated as appropriate: allergies, current medications, past family history, past medical history, past social history, past surgical history and problem list.    Review of Systems:   Pertinent positives/negatives listed in HPI above    Physical Exam:   Vitals:    05/13/25 1529   Temp: 97.7 °F (36.5 °C)   TempSrc: Temporal   Weight: 88.9 kg (196 lb)   Height: 172.7 cm (68\")   PainSc: 3   Comment: 7/10 at worse   PainLoc: Knee       General: A and O x 3, ASA, NAD      Knee Exam List: Knee:  right    ALIGNMENT:     Neutral  ,   Patella tracks   midline    GAIT:    Antalgic    SKIN:    No abnormality    RANGE OF MOTION:   Painful flexion    STRENGTH:   5 / 5    LIGAMENTS:    No varus / valgus instability.   Negative  Lachman.    MENISCUS:     Positive  medial   Leslie       DISTAL PULSES:    Paplable    DISTAL SENSATION :   Intact    LYMPHATICS:     No   lymphadenopathy    OTHER:          - Positive  effusion      - No crepitance with ROM        Radiology:  Xrays 3views right knee (ap,lateral, sunrise) recently taken at outside institution were reviewed demonstrating mild joint space narrowing.  No other knee x-rays available for comparison purposes.    Assessment/Plan: Right knee pain    Treatment option as well as imaging results were discussed in detail with the patient.  Patient's examination symptoms are suspicious for a meniscus tear.  He has pain with flexion as well as a positive Leslie's examination.  Patient has not responded well to 2 weeks of conservative measures and I do not believe that physical therapy will improve the patient's symptoms suspecting a meniscus tear therefore we are going to order an MRI of the right knee for further evaluation.  Once we get the MRI results I will review them with Dr. Araujo and we will contact the patient for " further treatment options at that time.      Harshal Chen, APRN  5/13/2025

## 2025-05-13 NOTE — PROGRESS NOTES
Subjective   The ABCs of the Annual Wellness Visit  Medicare Wellness Visit      Santos Smith is a 69 y.o. patient who presents for a Medicare Wellness Visit.    The following portions of the patient's history were reviewed and   updated as appropriate: He  has a past medical history of Acid reflux, Allergic, Allergic rhinitis, seasonal, Anemia, Arthritis, Condition not found, Depression, Hemorrhoids, Hypertension, and Thyroid disease.  He does not have any pertinent problems on file.  He  has a past surgical history that includes Colonoscopy.  His family history includes Arthritis in his mother; Cancer in his father and sister.  He  reports that he quit smoking about 9 months ago. His smoking use included cigarettes. He started smoking about 54 years ago. He has a 40.2 pack-year smoking history. He has been exposed to tobacco smoke. He has never used smokeless tobacco. He reports current alcohol use of about 6.0 standard drinks of alcohol per week. He reports that he does not use drugs.  Current Outpatient Medications   Medication Sig Dispense Refill    methocarbamol (ROBAXIN) 500 MG tablet Take 1 tablet by mouth 4 (Four) Times a Day. 45 tablet 1    diclofenac (VOLTAREN) 50 MG EC tablet Take 1 tablet by mouth 2 (Two) Times a Day As Needed (knee pain). 60 tablet 0    doxycycline (VIBRAMYCIN) 100 MG capsule Take 1 capsule by mouth 2 (Two) Times a Day for 7 days. 14 capsule 0    saccharomyces boulardii (Florastor) 250 MG capsule Take 1 capsule by mouth 2 (Two) Times a Day for 10 days. 20 capsule 0     No current facility-administered medications for this visit.     Current Outpatient Medications on File Prior to Visit   Medication Sig    methocarbamol (ROBAXIN) 500 MG tablet Take 1 tablet by mouth 4 (Four) Times a Day.    [DISCONTINUED] methylPREDNISolone (MEDROL) 4 MG dose pack Take as directed on package instructions. (Patient not taking: Reported on 5/13/2025)     No current facility-administered medications on  "file prior to visit.     He is allergic to prednisone..    Compared to one year ago, the patient's physical   health is the same.  Compared to one year ago, the patient's mental   health is the same.    Recent Hospitalizations:  He was not admitted to the hospital during the last year.     Current Medical Providers:  Patient Care Team:  Jeffrey Mcmullen MD as PCP - General (Family Medicine)  Ginna Barth APRN as Nurse Practitioner (Family Medicine)  Provider, No Known    Outpatient Medications Prior to Visit   Medication Sig Dispense Refill    methocarbamol (ROBAXIN) 500 MG tablet Take 1 tablet by mouth 4 (Four) Times a Day. 45 tablet 1    methylPREDNISolone (MEDROL) 4 MG dose pack Take as directed on package instructions. (Patient not taking: Reported on 5/13/2025) 1 each 0     No facility-administered medications prior to visit.     No opioid medication identified on active medication list. I have reviewed chart for other potential  high risk medication/s and harmful drug interactions in the elderly.      Aspirin is not on active medication list.  Aspirin use is not indicated based on review of current medical condition/s. Risk of harm outweighs potential benefits.  .    Patient Active Problem List   Diagnosis    Mixed hyperlipidemia    Primary hypertension    Arthritis    Abdominal hernia    Acid reflux    Anemia    Hemorrhoids    Thyroid disease    BMI 27.0-27.9,adult    Tobacco use    Depression     Advance Care Planning Advance Directive is not on file.  ACP discussion was held with the patient during this visit. Patient does not have an advance directive, information provided.            Objective   Vitals:    05/13/25 0938   BP: 124/68   BP Location: Left arm   Patient Position: Sitting   Cuff Size: Adult   Pulse: 61   Temp: 97.8 °F (36.6 °C)   TempSrc: Temporal   SpO2: 96%   Weight: 89.3 kg (196 lb 14.4 oz)   Height: 172.7 cm (68\")   PainSc: 3    PainLoc: Knee       Estimated body mass index is " "29.94 kg/m² as calculated from the following:    Height as of this encounter: 172.7 cm (68\").    Weight as of this encounter: 89.3 kg (196 lb 14.4 oz).    BMI is >= 25 and <30. (Overweight) The following options were offered after discussion;: exercise counseling/recommendations and nutrition counseling/recommendations           Does the patient have evidence of cognitive impairment? No                                                                                                Health  Risk Assessment    Smoking Status:  Social History     Tobacco Use   Smoking Status Former    Current packs/day: 0.00    Average packs/day: 0.8 packs/day for 53.6 years (40.2 ttl pk-yrs)    Types: Cigarettes    Start date:     Quit date: 2024    Years since quittin.7    Passive exposure: Past   Smokeless Tobacco Never   Tobacco Comments    8 cigs a week     Alcohol Consumption:  Social History     Substance and Sexual Activity   Alcohol Use Yes    Alcohol/week: 6.0 standard drinks of alcohol    Types: 6 Cans of beer per week    Comment: socially       Fall Risk Screen  STEADI Fall Risk Assessment was completed, and patient is at LOW risk for falls.Assessment completed on:2025    Depression Screening   Little interest or pleasure in doing things? Not at all   Feeling down, depressed, or hopeless? Not at all   PHQ-2 Total Score 0      Health Habits and Functional and Cognitive Screenin/13/2025     9:36 AM   Functional & Cognitive Status   Do you have difficulty preparing food and eating? No   Do you have difficulty bathing yourself, getting dressed or grooming yourself? No   Do you have difficulty using the toilet? No   Do you have difficulty moving around from place to place? No   Do you have trouble with steps or getting out of a bed or a chair? No   Current Diet Well Balanced Diet   Dental Exam Not up to date   Eye Exam Not up to date   Exercise (times per week) 3 times per week   Current Exercises Include " Cardiovascular Workout;Stationary Bicycling/Spin Class;Stair Step Machine;Walking;Light Weights        Exercise Comment crossfit   Do you need help using the phone?  No   Are you deaf or do you have serious difficulty hearing?  No   Do you need help to go to places out of walking distance? No   Do you need help shopping? No   Do you need help preparing meals?  No   Do you need help with housework?  No   Do you need help with laundry? No   Do you need help taking your medications? No   Do you need help managing money? No   Do you ever drive or ride in a car without wearing a seat belt? No   Have you felt unusual stress, anger or loneliness in the last month? No   Who do you live with? Other   If you need help, do you have trouble finding someone available to you? No   Have you been bothered in the last four weeks by sexual problems? No   Do you have difficulty concentrating, remembering or making decisions? No           Age-appropriate Screening Schedule:  Refer to the list below for future screening recommendations based on patient's age, sex and/or medical conditions. Orders for these recommended tests are listed in the plan section. The patient has been provided with a written plan.    Health Maintenance List  Health Maintenance   Topic Date Due    LUNG CANCER SCREENING  06/18/2025    COVID-19 Vaccine (6 - 2024-25 season) 04/02/2026 (Originally 9/1/2024)    INFLUENZA VACCINE  07/01/2025    LIPID PANEL  12/13/2025    COLORECTAL CANCER SCREENING  03/08/2026    ANNUAL WELLNESS VISIT  05/13/2026    TDAP/TD VACCINES (2 - Td or Tdap) 03/30/2033    HEPATITIS C SCREENING  Completed    Pneumococcal Vaccine 50+  Completed    AAA SCREEN ONCE  Completed    ZOSTER VACCINE  Completed                                                                                                                                                CMS Preventative Services Quick Reference  Risk Factors Identified During Encounter  Immunizations  "Discussed/Encouraged: Shingrix  Inactivity/Sedentary: Patient was advised to exercise at least 150 minutes a week per CDC recommendations.    The above risks/problems have been discussed with the patient.  Pertinent information has been shared with the patient in the After Visit Summary.  An After Visit Summary and PPPS were made available to the patient.    Follow Up:   Next Medicare Wellness visit to be scheduled in 1 year.         Additional E&M Note during same encounter follows:  Patient has additional, significant, and separately identifiable condition(s)/problem(s) that require work above and beyond the Medicare Wellness Visit     Chief Complaint  Medicare Wellness-subsequent and Knee Pain (Rt Knee is swollen, warm to touch with some redness x 3 weeks. Pt has been elevating with ice and heat all week. )    Subjective   Right knee pain and redness and warmth x 3 weeks- gradual onset- dec ROM- no known injury    Discussed CT chest low dose for lung cancer screen- pt still not smoking.      Santos is also being seen today for an annual adult preventative physical exam.  and Santos is also being seen today for additional medical problem/s.    Review of Systems   Constitutional:  Negative for fatigue and fever.   HENT:  Negative for sore throat.    Eyes:  Negative for visual disturbance.   Respiratory:  Negative for apnea, cough, shortness of breath and wheezing.    Cardiovascular:  Negative for chest pain, palpitations and leg swelling.   Gastrointestinal:  Negative for abdominal pain, diarrhea, nausea and vomiting.   Skin:  Negative for rash.   Neurological:  Negative for dizziness and light-headedness.              Objective   Vital Signs:  /68 (BP Location: Left arm, Patient Position: Sitting, Cuff Size: Adult)   Pulse 61   Temp 97.8 °F (36.6 °C) (Temporal)   Ht 172.7 cm (68\")   Wt 89.3 kg (196 lb 14.4 oz)   SpO2 96%   BMI 29.94 kg/m²   Physical Exam  Vitals reviewed.   Constitutional:       " Appearance: Normal appearance. He is well-developed.   HENT:      Head: Normocephalic and atraumatic.      Right Ear: External ear normal.      Left Ear: External ear normal.      Mouth/Throat:      Pharynx: No oropharyngeal exudate.   Eyes:      Conjunctiva/sclera: Conjunctivae normal.      Pupils: Pupils are equal, round, and reactive to light.   Cardiovascular:      Rate and Rhythm: Normal rate and regular rhythm.      Pulses: Normal pulses.      Heart sounds: Normal heart sounds. No murmur heard.     No friction rub. No gallop.   Pulmonary:      Effort: Pulmonary effort is normal.      Breath sounds: Normal breath sounds. No wheezing or rhonchi.   Abdominal:      General: Abdomen is flat. Bowel sounds are normal. There is no distension.      Palpations: Abdomen is soft. There is no mass.      Tenderness: There is no abdominal tenderness. There is no guarding or rebound.      Hernia: No hernia is present.   Musculoskeletal:      Comments: Right knee mild swelling, tenderness, redness, warmth, dec ROM, stable, no bruising.   Skin:     General: Skin is warm and dry.      Capillary Refill: Capillary refill takes less than 2 seconds.   Neurological:      General: No focal deficit present.      Mental Status: He is alert and oriented to person, place, and time.      Cranial Nerves: No cranial nerve deficit.   Psychiatric:         Mood and Affect: Mood and affect normal.         Behavior: Behavior normal.         Thought Content: Thought content normal.         Judgment: Judgment normal.         The following data was reviewed by: Jeffrey Mcmullen MD on 05/13/2025:  Data reviewed : Radiologic studies I viewed and interpreted 2 views right knee x-rays:no fxs.  CMP          12/13/2024    11:11 12/26/2024    12:10   CMP   Glucose 100  121    BUN 13  15    Creatinine 0.95  0.93    EGFR 87.2  89.4    Sodium 137  140    Potassium 5.3  4.1    Chloride 105  104    Calcium 9.4  9.4    Total Protein 7.3     Albumin 4.2      Globulin 3.1     Total Bilirubin 0.5     Alkaline Phosphatase 88     AST (SGOT) 24     ALT (SGPT) 17     Albumin/Globulin Ratio 1.4     BUN/Creatinine Ratio 13.7  16.1    Anion Gap 6.5  10.8      CBC          12/13/2024    11:11   CBC   WBC 10.74    RBC 4.96    Hemoglobin 16.5    Hematocrit 47.3    MCV 95.4    MCH 33.3    MCHC 34.9    RDW 12.7    Platelets 281      Lipid Panel          12/13/2024    11:11   Lipid Panel   Total Cholesterol 145    Triglycerides 118    HDL Cholesterol 47    VLDL Cholesterol 21    LDL Cholesterol  77    LDL/HDL Ratio 1.58      TSH          12/13/2024    11:11   TSH   TSH 0.658               Assessment and Plan Additional age appropriate preventative wellness advice topics were discussed during today's preventative wellness exam(some topics already addressed during AWV portion of the note above):   Nutrition: Discussed nutrition plan with patient. Information shared in after visit summary. Goal is for a well balanced diet to enhance overall health.     Acute pain of right knee    Orders:    XR Knee 1 or 2 View Right (In Office)    Ambulatory Referral to Orthopedic Surgery    diclofenac (VOLTAREN) 50 MG EC tablet; Take 1 tablet by mouth 2 (Two) Times a Day As Needed (knee pain).    doxycycline (VIBRAMYCIN) 100 MG capsule; Take 1 capsule by mouth 2 (Two) Times a Day for 7 days.    saccharomyces boulardii (Florastor) 250 MG capsule; Take 1 capsule by mouth 2 (Two) Times a Day for 10 days.    Elevated random blood glucose level    Orders:    Hemoglobin A1c    Primary hypertension  Hypertension is stable and controlled  Continue current treatment regimen.  Blood pressure will be reassessed in 6 months.    Orders:    CBC Auto Differential    Comprehensive Metabolic Panel    TSH+Free T4    Mixed hyperlipidemia   Lipid abnormalities are stable    Plan:  Continue same medication/s without change.      Discussed medication dosage, use, side effects, and goals of treatment in detail.    Counseled  patient on lifestyle modifications to help control hyperlipidemia.     Patient Treatment Goals:   LDL goal is less than 70    Followup in 6 months.    Orders:    Lipid Panel    Screening PSA (prostate specific antigen)    Orders:    PSA Screen    Screening for lung cancer    Orders:     CT Chest Low Dose Cancer Screening WO; Future    Personal history of nicotine dependence    Orders:     CT Chest Low Dose Cancer Screening WO; Future            Follow Up   Return in about 1 week (around 5/20/2025) for Recheck.  Patient was given instructions and counseling regarding his condition or for health maintenance advice. Please see specific information pulled into the AVS if appropriate.

## 2025-05-13 NOTE — PROGRESS NOTES
Venipuncture Blood Specimen Collection  Venipuncture performed by Coty Ramírez with good hemostasis. Patient tolerated the procedure well without complications.   05/13/25   Connie Medina MA

## 2025-06-03 ENCOUNTER — HOSPITAL ENCOUNTER (OUTPATIENT)
Dept: MRI IMAGING | Facility: HOSPITAL | Age: 70
Discharge: HOME | End: 2025-06-03
Admitting: NURSE PRACTITIONER
Payer: MEDICARE

## 2025-06-03 DIAGNOSIS — M25.361 KNEE INSTABILITY, RIGHT: ICD-10-CM

## 2025-06-03 DIAGNOSIS — M25.561 ACUTE PAIN OF RIGHT KNEE: ICD-10-CM

## 2025-06-03 PROCEDURE — 73721 MRI JNT OF LWR EXTRE W/O DYE: CPT

## 2025-06-10 ENCOUNTER — TELEPHONE (OUTPATIENT)
Dept: ORTHOPEDIC SURGERY | Facility: CLINIC | Age: 70
End: 2025-06-10

## 2025-06-10 NOTE — TELEPHONE ENCOUNTER
Caller: Santos Smith    Relationship to patient: Self    Best call back number: [phone]    Chief complaint: RIGHT KNEE     Type of visit: MRI FOLLOW UP     Requested date: ASAP      If rescheduling, when is the original appointment: N/A      Additional notes: HAD MRI ON 6-3-25 @  ORDERED BY GEOVANI ALEJO. NEEDS F/U WITH DR ORANTES. [facility] HAS NO AVAILABILITY UNTIL AUGUST.

## 2025-06-11 ENCOUNTER — RESULTS FOLLOW-UP (OUTPATIENT)
Dept: ORTHOPEDIC SURGERY | Facility: CLINIC | Age: 70
End: 2025-06-11
Payer: MEDICARE

## 2025-06-11 ENCOUNTER — TELEPHONE (OUTPATIENT)
Dept: ORTHOPEDIC SURGERY | Facility: CLINIC | Age: 70
End: 2025-06-11
Payer: MEDICARE

## 2025-06-11 NOTE — TELEPHONE ENCOUNTER
I attempted to contact the patient to discuss their MRI results as well as Dr. Araujo's treatment recommendations by phone call.  Patient did not answer therefore I left them a voicemail instructing them to give us a call back at our office so we could discuss their results and treatment recommendations.    DURGA Martinez

## 2025-06-13 ENCOUNTER — TELEPHONE (OUTPATIENT)
Dept: ORTHOPEDIC SURGERY | Facility: CLINIC | Age: 70
End: 2025-06-13

## 2025-06-13 NOTE — TELEPHONE ENCOUNTER
Please schedule this patient an appointment to see me within the next 2 weeks for further evaluation and treatment options.

## 2025-06-13 NOTE — TELEPHONE ENCOUNTER
----- Message from Harshal Chen sent at 6/5/2025  2:46 PM EDT -----    ----- Message -----  From: Interface, Rad Parkyaise In  Sent: 6/5/2025  11:49 AM EDT  To: DURGA Martinez

## 2025-06-13 NOTE — TELEPHONE ENCOUNTER
Caller: Santos Smith    Relationship to patient: Self    Best call back number: [phone]    Chief complaint: RIGHT KNEE PAIN - MRI 6/3/25    Type of visit: MRI FOLLOW UP    Requested date: ASAP    Additional notes: THE NEXT AVAILABLE APPT WITH DR ORANTES IS 8/14/25

## 2025-06-13 NOTE — PROGRESS NOTES
Please call this patient and schedule him an appointment to see me in the next week or 2 or I have an available opening to discuss these results as well as perform necessary treatment options.  Okay to add to my schedule where appropriate.

## 2025-06-17 ENCOUNTER — OFFICE VISIT (OUTPATIENT)
Dept: ORTHOPEDIC SURGERY | Facility: CLINIC | Age: 70
End: 2025-06-17
Payer: MEDICARE

## 2025-06-17 VITALS — HEIGHT: 68 IN | BODY MASS INDEX: 30.16 KG/M2 | TEMPERATURE: 97.2 F | WEIGHT: 199 LBS

## 2025-06-17 DIAGNOSIS — M17.11 PRIMARY OSTEOARTHRITIS OF RIGHT KNEE: Primary | ICD-10-CM

## 2025-06-17 RX ORDER — LIDOCAINE HYDROCHLORIDE 10 MG/ML
5 INJECTION, SOLUTION EPIDURAL; INFILTRATION; INTRACAUDAL; PERINEURAL
Status: COMPLETED | OUTPATIENT
Start: 2025-06-17 | End: 2025-06-17

## 2025-06-17 RX ORDER — TRIAMCINOLONE ACETONIDE 40 MG/ML
80 INJECTION, SUSPENSION INTRA-ARTICULAR; INTRAMUSCULAR
Status: COMPLETED | OUTPATIENT
Start: 2025-06-17 | End: 2025-06-17

## 2025-06-17 RX ADMIN — TRIAMCINOLONE ACETONIDE 80 MG: 40 INJECTION, SUSPENSION INTRA-ARTICULAR; INTRAMUSCULAR at 12:42

## 2025-06-17 RX ADMIN — LIDOCAINE HYDROCHLORIDE 5 ML: 10 INJECTION, SOLUTION EPIDURAL; INFILTRATION; INTRACAUDAL; PERINEURAL at 12:42

## 2025-06-17 NOTE — PROGRESS NOTES
"Patient: Santos Smith  YOB: 1955 69 y.o. male  Medical Record Number: [id number]    Chief Complaints:   Chief Complaint   Patient presents with   • Right Knee - Follow-up       History of Present Illness:Santos Smith is a 69 y.o. male who presents for follow-up of right knee pain that is acute in nature.  Patient was seen approximately 1 month ago for an issue that occurred during CrossFit.  Patient had an MRI of the right knee which showed he had a torn meniscus, worsening arthritis which showed on x-ray films.  Patient reports his symptoms have not drastically improved since his last visit.  Still has pain globally to the anterior portion of the knee and rates it about a 4 on a scale of 10.  He is here today for reevaluation and further treatment options.    Allergies:   Allergies   Allergen Reactions   • Prednisone Arrhythmia       Medications:   Current Outpatient Medications   Medication Sig Dispense Refill   • diclofenac (VOLTAREN) 50 MG EC tablet Take 1 tablet by mouth 2 (Two) Times a Day As Needed (knee pain). (Patient not taking: Reported on 6/17/2025) 60 tablet 0   • methocarbamol (ROBAXIN) 500 MG tablet Take 1 tablet by mouth 4 (Four) Times a Day. (Patient not taking: Reported on 6/17/2025) 45 tablet 1     No current facility-administered medications for this visit.         The following portions of the patient's history were reviewed and updated as appropriate: allergies, current medications, past family history, past medical history, past social history, past surgical history and problem list.    Review of Systems:   Pertinent positives/negative listed in HPI above    Physical Exam:   Vitals:    06/17/25 0907   Temp: 97.2 °F (36.2 °C)   TempSrc: Temporal   Weight: 90.3 kg (199 lb)   Height: 172.7 cm (68\")   PainSc: 4    PainLoc: Knee       General: A and O x 3, ASA, NAD      Knee Exam List: Knee:  right                          ALIGNMENT:     Neutral  ,   Patella tracks   midline    "                       GAIT:    Antalgic                          SKIN:    No abnormality                          RANGE OF MOTION:   Painful flexion                          STRENGTH:   5 / 5                          LIGAMENTS:    No varus / valgus instability.   Negative  Lachman.                          MENISCUS:     Positive  medial   Leslie                             DISTAL PULSES:    Paplable                          DISTAL SENSATION :   Intact                          LYMPHATICS:     No   lymphadenopathy                          OTHER:          - Positive  effusion                                                  - No crepitance with ROM                          Radiology:  Xrays 3views right knee (ap,lateral, sunrise) recently taken at outside institution were reviewed demonstrating mild joint space narrowing.  No new knee x-rays were taken today for comparison purposes.    Findings:  Osseous Structures and Intra-Articular:  There is tricompartmental osteophyte formation. Mild subchondral edema at the anterior-medial aspect of the medial tibial plateau and at the posterior-superior aspect of the medial femoral condyle.     There is a small to moderate knee effusion.     Ligaments:  The anterior and posterior cruciate ligaments appear intact. No definite acute medial or lateral collateral ligament injury.     Menisci:  There is complex tear of the medial meniscus posterior horn and body. There appears to be horizontal and radial tear components with mild extrusion of the meniscal body into the medial joint gutter.     There is hyperintense signal at the posterior root of the lateral meniscus which may be related to degeneration or partial tear. No other definite lateral meniscus defect.     Extensor compartment:  Quadriceps and patellar tendons appear intact. Patellar alignment appears within the limits.     Cartilage:  There is diffuse high-grade cartilage thinning of the central and medial aspect of the  medial tibiofemoral compartment. There is focal full-thickness cartilage loss at the anterior-medial aspect of the medial tibial plateau with mild underlying   subchondral edema.     There appears to be full-thickness cartilage loss at the superior median ridge/medial facet of the patella. There appears to be additional partial thickness cartilage loss of the central and medial patellofemoral compartment.     Mild lateral tibiofemoral chondromalacia.     Soft tissues:  Mild subcutaneous edema surrounds the knee. No significant Baker's cyst. No other definite localized collection.     Miscellaneous:  There is suspected tendinopathy of the popliteus, semimembranosus, and medial gastrocnemius tendons. No definite acute tendon defect.           IMPRESSION:  Impression:  1.Complex tear of the medial meniscus with mild extrusion of the meniscal body into the medial joint gutter.  2.Tricompartmental osteoarthritis with advanced chondromalacia of the medial tibiofemoral and patellofemoral compartments.  3.Small to moderate knee effusion.  4.Degeneration versus partial tear of the posterior root of the lateral meniscus.     Assessment/Plan: Right knee pain /primary osteoarthritis right knee /medial meniscus tear    Treatment option as well as imaging results were discussed in detail with the patient.  At this point in time I would like to proceed forward with conservative measures.  I am going to give the patient a prescription for physical therapy to work on quad strengthening and range of motion exercises.  I have educated the patient on the RICE method to help with inflammation and swelling.  I am also going to give him an intra-articular joint injection to see if we can calm his symptoms down.  He can follow back up with me as needed.    Large Joint Arthrocentesis: R knee  Date/Time: 6/17/2025 12:42 PM  Consent given by: patient  Site marked: site marked  Timeout: Immediately prior to procedure a time out was called to  verify the correct patient, procedure, equipment, support staff and site/side marked as required   Supporting Documentation  Indications: pain and joint swelling   Procedure Details  Location: knee - R knee  Preparation: Patient was prepped and draped in the usual sterile fashion  Needle size: 22 G  Approach: anterolateral  Medications administered: 5 mL lidocaine PF 1% 1 %; 80 mg triamcinolone acetonide 40 MG/ML  Patient tolerance: patient tolerated the procedure well with no immediate complications        DURGA Martinez  6/17/2025

## 2025-06-20 ENCOUNTER — HOSPITAL ENCOUNTER (OUTPATIENT)
Dept: CT IMAGING | Facility: HOSPITAL | Age: 70
Discharge: HOME | End: 2025-06-20
Payer: MEDICARE

## 2025-06-20 DIAGNOSIS — Z12.2 SCREENING FOR LUNG CANCER: ICD-10-CM

## 2025-06-20 DIAGNOSIS — Z87.891 PERSONAL HISTORY OF NICOTINE DEPENDENCE: ICD-10-CM

## 2025-06-20 PROCEDURE — 71271 CT THORAX LUNG CANCER SCR C-: CPT

## 2025-06-27 DIAGNOSIS — M25.361 KNEE INSTABILITY, RIGHT: ICD-10-CM

## 2025-06-27 DIAGNOSIS — M25.561 ACUTE PAIN OF RIGHT KNEE: Primary | ICD-10-CM

## 2025-06-27 DIAGNOSIS — M17.11 PRIMARY OSTEOARTHRITIS OF RIGHT KNEE: ICD-10-CM

## 2025-07-10 ENCOUNTER — TREATMENT (OUTPATIENT)
Dept: PHYSICAL THERAPY | Facility: CLINIC | Age: 70
End: 2025-07-10
Payer: MEDICARE

## 2025-07-10 DIAGNOSIS — M17.11 PRIMARY OSTEOARTHRITIS OF RIGHT KNEE: ICD-10-CM

## 2025-07-10 DIAGNOSIS — M23.51 RECURRENT RIGHT KNEE INSTABILITY: ICD-10-CM

## 2025-07-10 DIAGNOSIS — M25.561 ACUTE PAIN OF RIGHT KNEE: Primary | ICD-10-CM

## 2025-07-10 NOTE — PROGRESS NOTES
"  Physical Therapy Initial Evaluation and Plan of Care                           [address] [place] [address] [address], [address], [place]  [address]    Patient: Santos Smith   : 1955  Diagnosis/ICD-10 Code:  Acute pain of right knee [M25.561]  Referring practitioner: DURGA Martinez  Date of Initial Visit: 7/10/2025  Today's Date: 7/10/2025  Patient seen for 1 sessions           Subjective Questionnaire: oxford knee: 34/48 = 71% function      Subjective Evaluation    History of Present Illness  Date of onset: 4/15/2025  Mechanism of injury: Med dx: R knee instability, acute R knee pn, primary OA R knee.    MRI 6/3/25 R knee: Impression:   1.Complex tear of the medial meniscus with mild extrusion of the meniscal body into the medial joint gutter.   2.Tricompartmental osteoarthritis with advanced chondromalacia of the medial tibiofemoral and patellofemoral compartments.   3.Small to moderate knee effusion.   4.Degeneration versus partial tear of the posterior root of the lateral meniscus.     Pt admits at least 1 recent fall d/t poor shoes. Difficulty descending stairs. \"I tweaked my R knee sometime in April but I don't know what I did. I did go to cross fit 3x/wk and used push mower. But when I went to ortho and he said it was an acute twist of the knee that tore the meniscus. But he led me to believe it was loss of cartilage causing my problems. This [right] knee was scoped about 13 years ago. Ramirez Reno injected me with a substitute for cortisone . It has helped but it's still not great. Pain is 3-4/10 but not every day. It may go up to 4 or 5 max after I've been doing work. The swelling is the thing that got me the most. I'm used to going to cross fit, lifting, jumping, and lunges.\" He notes he quit going to cross fit after MRI, about the past month. He'd been going for about 18 months to get out of the house after his wife . He was told the worse thing he could do with the knee was jump and do squats. The " R knee feels tingly & almost numb this morning. Most of the pain is at medial knee.         Patient Occupation: retired, likes cross fit Diagnostic Tests  MRI studies: abnormal           Objective          Tenderness     Right Knee   Tenderness in the medial joint line, patellar tendon and popliteal fossa.     Active Range of Motion   Left Knee   Flexion: 137 degrees   Extension: 0 degrees     Right Knee   Flexion: 121 degrees   Extension: 0 degrees     Additional Active Range of Motion Details  30s STS: 17 w/mild discomfort at patellar tendon at end        Strength/Myotome Testing     Left Hip   Planes of Motion   Flexion: 5  Abduction: 5    Right Hip   Planes of Motion   Flexion: 5  Abduction: 4-    Left Knee   Flexion: 5  Extension: 5    Right Knee   Flexion: 5  Extension: 5    Tests     Left Hip   Modified Carlos: Positive. Hip flexion: 0. Knee flexion: 30.     Right Hip   Modified Carlos: Positive. Hip flexion: 0. Knee flexion: 20.     Swelling     Left Knee Girth Measurement (cm)   Joint line: 39.5 cm  10 cm above joint line: 46.5 cm    Right Knee Girth Measurement (cm)   Joint line: 40 cm  10 cm above joint line: 45 cm          SCT: Pt was provided with a hard copy of the HEP and instructed in use of it and all listed exercises.  Pt was instructed to cease any exercise that was painful, or that feels as though the form is incorrect.  Pt will return with any questions or difficulty at next session.  Pt acknowledged these instructions and agreed.   Access Code: [email or web address]  URL: [email or web address]  Date: 07/10/2025  Prepared by: Jaqueline Gallagher    Exercises  - Supine Knee Posterior Glide Self-Mobilization  - 2 x daily - 7 x weekly - 20 reps  - Straight Leg Raise with External Rotation  - 2 x daily - 7 x weekly - 1 sets - 10 reps - 3s hold  - Supine Pelvic Tilt with Straight Leg Raise  - 2 x daily - 7 x weekly - 1 sets - 10 reps - 3s hold  - Carlos Stretch on Table  - 3 x daily - 7 x weekly - 3 reps  - 30s hold    Assessment & Plan       Assessment  Impairments: abnormal gait, abnormal muscle firing, abnormal muscle tone, abnormal or restricted ROM, activity intolerance, impaired balance, impaired physical strength, lacks appropriate home exercise program, pain with function, safety issue and weight-bearing intolerance   Functional limitations: carrying objects, lifting, sleeping, walking, uncomfortable because of pain, moving in bed, sitting and standing   Assessment details: Pt presents with medial meniscus complex tear in addition to tricompartmental OA and lateral meniscus degenerative changes. He exhibits mild R knee edema, limited knee flexion ROM, and stiffness of R quad. He has generally been really active and fit but has not been participating in gym activities the past month. He is getting  at end of August and plans to joint [facility] at that time. 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 He is highly motivated.   The patient is an appropriate candidate for physical therapy and will benefit from skilled patient intervention in order address the above impairments/limitations.  The plan of care, goals and treatment plan were discussed with the patient and the patient is agreeable to participating in patient services.   Prognosis: good    Goals  Plan Goals: STG to be met in 6 wks:   1. Pt will demonstrate at least 135 deg R knee flexion actively to get in/out of car with ease.   2. Pt will demonstrate R knee jt line girth no greater than 39.5cm.    LTG to be met in 12 wks:   1. Pt will demonstrate reciprocal stair descent without UE support for negotiating home.  2. Pt will demonstrate at least 60 deg R & L knee flexion w/0 deg hip flexion (modified arnel test).   3. Pt will demonstrate at least 17 STS (30s STS test) with no R knee pain for rising from restaurant chairs without arm rests.       Plan  Therapy options: will be seen for skilled therapy services  Planned modality  interventions: cryotherapy, high voltage pulsed current (pain management), electrical stimulation/Russian stimulation, TENS, ultrasound, thermotherapy (hydrocollator packs) and dry needling  Planned therapy interventions: ADL retraining, balance/weight-bearing training, body mechanics training, functional ROM exercises, gait training, home exercise program, IADL retraining, joint mobilization, manual therapy, neuromuscular re-education, postural training, soft tissue mobilization, motor coordination training, strengthening, stretching, therapeutic activities and flexibility  Frequency: 2x week  Duration in weeks: 12  Treatment plan discussed with: patient        Timed:         Manual Therapy:         mins  38493;     Therapeutic Exercise:    15     mins  10129;     Neuromuscular Arnold:        mins  31435;    Therapeutic Activity:          mins  52785;     Gait Training:           mins  81483;     Ultrasound:     10     mins  37705;    Self-care  __15__ mins 99009  Tests & Measures              mins  99210      Un-Timed:  Electrical Stimulation:         mins  33724 ( );  Dry Needling          mins self-pay 52775  Traction          mins 54039  Low Eval          mins  92599  Mod Eval     15     mins  41324  High Eval                            mins  06809  Canal repositioning ___  mins  82450        Timed Treatment:   40   mins   Total Treatment:     55   mins    PT SIGNATURE: Jaqueline Gallagher PT, DPT, cert. DN  [place] license # [id number]  Electronically signed by Jaqueline Gallagher PT, 07/10/25, 6:57 AM EDT    Initial Certification  Certification Period: 7/10/2025 through 10/7/2025  I certify that the therapy services are furnished while this patient is under my care.  The services outlined above are required by this patient, and will be reviewed every 90 days.     PHYSICIAN: Harshal Chen APRN    NPI: [id number]                                        DATE: ______________________________________________________________________________________________     Please sign and return via fax to [phone]. Thank you, [facility].

## 2025-07-15 ENCOUNTER — TREATMENT (OUTPATIENT)
Dept: PHYSICAL THERAPY | Facility: CLINIC | Age: 70
End: 2025-07-15
Payer: MEDICARE

## 2025-07-15 DIAGNOSIS — M25.561 ACUTE PAIN OF RIGHT KNEE: Primary | ICD-10-CM

## 2025-07-15 DIAGNOSIS — M17.11 PRIMARY OSTEOARTHRITIS OF RIGHT KNEE: ICD-10-CM

## 2025-07-15 DIAGNOSIS — M23.51 RECURRENT RIGHT KNEE INSTABILITY: ICD-10-CM

## 2025-07-15 NOTE — PROGRESS NOTES
Physical Therapy Daily Treatment Note  [address], [address], [facility] [place]  [address]    Patient: Santos Smith   : 1955  Diagnosis/ICD-10 Code:  Acute pain of right knee [M25.561]   Problems Addressed this Visit    None  Visit Diagnoses         Acute pain of right knee    -  Primary      Primary osteoarthritis of right knee          Recurrent right knee instability              Diagnoses         Codes Comments      Acute pain of right knee    -  Primary ICD-10-CM: M25.561  ICD-9-CM: 719.46       Primary osteoarthritis of right knee     ICD-10-CM: M17.11  ICD-9-CM: 715.16       Recurrent right knee instability     ICD-10-CM: M23.51  ICD-9-CM: 718.86           Referring practitioner: DURGA Martinez  Date of Initial Visit: Type: THERAPY  Noted: 7/10/2025  Today's Date: 7/15/2025    VISIT#: 2    Subjective   Santos reports no issues with his HEP as of yet. He did some exercises in the pool yesterday.     Objective     See Exercise, Manual, and Modality Logs for complete treatment.     Assessment/Plan  Held on knee tape so we can do US next session. Will apply tape on Thursday for patellar tendon support and medial knee jt line space correction.   Progress per Plan of Care         Timed:         Manual Therapy:         mins  68249;     Therapeutic Exercise:    15     mins  73224;     Neuromuscular Arnold:        mins  40778;    Therapeutic Activity:          mins  19272;     Gait Training:           mins  56222;     Ultrasound:     10     mins  72468;    Ionto                                   mins   72850  Self Care                            mins   07049  Tests & Measures              mins   83870  Austrian stim                    mins   71541    Un-Timed:  Canalith Repos                   mins  05535  Electrical Stimulation:         mins  94313 ( );  Dry Needling          mins 73845/  Traction          mins 62882  Low Eval          Mins  01320  Mod Eval          Mins  67599  High Eval                             Mins  23664  Re-Eval                               mins  80404    Timed Treatment:   25   mins   Total Treatment:     25   mins    Jaqueline Gallagher, PT, DPT, cert. DN  Physical Therapist  [place] Lic # [id number]

## 2025-07-17 ENCOUNTER — TREATMENT (OUTPATIENT)
Dept: PHYSICAL THERAPY | Facility: CLINIC | Age: 70
End: 2025-07-17
Payer: MEDICARE

## 2025-07-17 DIAGNOSIS — M25.561 ACUTE PAIN OF RIGHT KNEE: Primary | ICD-10-CM

## 2025-07-17 DIAGNOSIS — M17.11 PRIMARY OSTEOARTHRITIS OF RIGHT KNEE: ICD-10-CM

## 2025-07-17 DIAGNOSIS — M23.51 RECURRENT RIGHT KNEE INSTABILITY: ICD-10-CM

## 2025-07-22 ENCOUNTER — TREATMENT (OUTPATIENT)
Dept: PHYSICAL THERAPY | Facility: CLINIC | Age: 70
End: 2025-07-22
Payer: MEDICARE

## 2025-07-22 DIAGNOSIS — M23.51 RECURRENT RIGHT KNEE INSTABILITY: ICD-10-CM

## 2025-07-22 DIAGNOSIS — M25.561 ACUTE PAIN OF RIGHT KNEE: Primary | ICD-10-CM

## 2025-07-22 DIAGNOSIS — M17.11 PRIMARY OSTEOARTHRITIS OF RIGHT KNEE: ICD-10-CM

## 2025-07-22 PROCEDURE — 97110 THERAPEUTIC EXERCISES: CPT | Performed by: PSYCHIATRY & NEUROLOGY

## 2025-07-22 PROCEDURE — 97112 NEUROMUSCULAR REEDUCATION: CPT | Performed by: PSYCHIATRY & NEUROLOGY

## 2025-07-22 NOTE — PROGRESS NOTES
Physical Therapy Daily Treatment Note      Patient: Santos Smith   : 1955  Diagnosis/ICD-10 Code:  Acute pain of right knee [M25.561]  Referring practitioner: DURGA Martinez  Date of Initial Visit: Type: THERAPY  Noted: 7/10/2025  Today's Date: 2025  Patient seen for 4 sessions         Santos Smith reports: he has no pain this morning and has been feeling much improved he states the ultrasound seems to help more than anything.    Objective   See Exercise, Manual, and Modality Logs for complete treatment.     Assessment/Plan  Pt. Is having great response to treatment at this time an pain has been alleviated and mobility continues to improve with stretching.    Goals  Plan Goals: STG to be met in 6 wks:   1. Pt will demonstrate at least 135 deg R knee flexion actively to get in/out of car with ease.   2. Pt will demonstrate R knee jt line girth no greater than 39.5cm.     LTG to be met in 12 wks:   1. Pt will demonstrate reciprocal stair descent without UE support for negotiating home.  2. Pt will demonstrate at least 60 deg R & L knee flexion w/0 deg hip flexion (modified arnel test).   3. Pt will demonstrate at least 17 STS (30s STS test) with no R knee pain for rising from restaurant chairs without arm rests.     Progress strengthening /stabilization /functional activity       Timed:           Therapeutic Exercise:    10     mins  17162;     Neuromuscular Arnold:   10    mins  95700;    Ultrasound:    10      mins  14363;        Timed Treatment:   30   mins   Total Treatment:     30   mins    Michaela Arechiga PTA  Physical Therapist Assistant License #[id number]

## 2025-07-24 ENCOUNTER — TREATMENT (OUTPATIENT)
Dept: PHYSICAL THERAPY | Facility: CLINIC | Age: 70
End: 2025-07-24
Payer: MEDICARE

## 2025-07-24 DIAGNOSIS — M25.561 ACUTE PAIN OF RIGHT KNEE: Primary | ICD-10-CM

## 2025-07-24 DIAGNOSIS — M23.51 RECURRENT RIGHT KNEE INSTABILITY: ICD-10-CM

## 2025-07-24 DIAGNOSIS — M17.11 PRIMARY OSTEOARTHRITIS OF RIGHT KNEE: ICD-10-CM

## 2025-07-24 NOTE — PROGRESS NOTES
Physical Therapy Daily Treatment Note  [address] [facility] [address], [address], [place]  [address]    Patient: Santos Smith   : 1955  Diagnosis/ICD-10 Code:  Acute pain of right knee [M25.561]   Problems Addressed this Visit    None  Visit Diagnoses         Acute pain of right knee    -  Primary      Primary osteoarthritis of right knee          Recurrent right knee instability              Diagnoses         Codes Comments      Acute pain of right knee    -  Primary ICD-10-CM: M25.561  ICD-9-CM: 719.46       Primary osteoarthritis of right knee     ICD-10-CM: M17.11  ICD-9-CM: 715.16       Recurrent right knee instability     ICD-10-CM: M23.51  ICD-9-CM: 718.86           Referring practitioner: DURGA Martinez  Date of Initial Visit: Type: THERAPY  Noted: 7/10/2025  Today's Date: 2025    VISIT#: 5    Subjective   Mata reports his R knee cont to feel better. The tape felt supportive and is still on.     Objective     See Exercise, Manual, and Modality Logs for complete treatment.     Assessment/Plan  Progressed quad activation/strength challenges today. Tape borders in tact thus left in place and held on US this date. At end of session Mata noted just the beginning of of R post knee pressure. Will monitor and may persist or slightly scale back next session.   Progress per Plan of Care         Timed:         Manual Therapy:         mins  63687;     Therapeutic Exercise:    15     mins  58566;     Neuromuscular Arnold:    8    mins  49518;    Therapeutic Activity:          mins  23565;     Gait Training:           mins  57034;     Ultrasound:          mins  16964;    Ionto                                   mins   94093  Self Care                            mins   21173  Tests & Measures              mins   79935  [place] stim                    mins   23240    Un-Timed:  Canalith Repos                   mins  27666  Electrical Stimulation:         mins  84261 ( );  Dry Needling          mins  20561/20560  Traction          mins 80283  Low Eval          Mins  71728  Mod Eval          Mins  19376  High Eval                            Mins  51263  Re-Eval                               mins  91365    Timed Treatment:   23   mins   Total Treatment:     23   mins    Jaqueline Gallagher, PT, DPT, cert. DN  Physical Therapist  [place] Lic # [id number]

## 2025-07-29 ENCOUNTER — TREATMENT (OUTPATIENT)
Dept: PHYSICAL THERAPY | Facility: CLINIC | Age: 70
End: 2025-07-29
Payer: MEDICARE

## 2025-07-29 DIAGNOSIS — M25.561 ACUTE PAIN OF RIGHT KNEE: Primary | ICD-10-CM

## 2025-07-29 DIAGNOSIS — M17.11 PRIMARY OSTEOARTHRITIS OF RIGHT KNEE: ICD-10-CM

## 2025-07-29 DIAGNOSIS — M23.51 RECURRENT RIGHT KNEE INSTABILITY: ICD-10-CM

## 2025-07-29 PROCEDURE — 97110 THERAPEUTIC EXERCISES: CPT | Performed by: PHYSICAL THERAPIST

## 2025-07-29 PROCEDURE — 97035 APP MDLTY 1+ULTRASOUND EA 15: CPT | Performed by: PHYSICAL THERAPIST

## 2025-07-29 PROCEDURE — 97112 NEUROMUSCULAR REEDUCATION: CPT | Performed by: PHYSICAL THERAPIST

## 2025-07-29 NOTE — PROGRESS NOTES
Physical Therapy Daily Treatment Note      Patient: Santos Smith   : 1955  Diagnosis/ICD-10 Code:  Acute pain of right knee [M25.561]  Referring practitioner: DURGA Martinez  Date of Initial Visit: Type: THERAPY  Noted: 7/10/2025  Today's Date: 2025  Patient seen for 6 sessions         Santos Smith reports: his pain has been staying down around a 2/10 but he has bene experiencing some distal quad tightness and stiffness in response to a few work days he had recently where he had to ascend and descend stairs all day.    Objective   See Exercise, Manual, and Modality Logs for complete treatment.     Assessment/Plan  Pt. Tolerates treatment well and responds well to ultrasound for healing and soreness relief. Pt. likes the taping and continues to benefit form improved stability.    Goals  Plan Goals: STG to be met in 6 wks:   1. Pt will demonstrate at least 135 deg R knee flexion actively to get in/out of car with ease.   2. Pt will demonstrate R knee jt line girth no greater than 39.5cm.     LTG to be met in 12 wks:   1. Pt will demonstrate reciprocal stair descent without UE support for negotiating home.  2. Pt will demonstrate at least 60 deg R & L knee flexion w/0 deg hip flexion (modified arnel test).   3. Pt will demonstrate at least 17 STS (30s STS test) with no R knee pain for rising from restaurant chairs without arm rests.     Progress strengthening /stabilization /functional activity       Timed:         Manual Therapy:         mins  29037;     Therapeutic Exercise:    15     mins  34491;     Neuromuscular Arnold:    15    mins  03502;    Therapeutic Activity:          mins  56110;     Gait Training:           mins  73803;     Ultrasound:     8     mins  95870;      Un-Timed:  Electrical Stimulation:         mins  26731 ( );  Dry Needling          mins self-pay , ;  Traction          mins 55056;      Timed Treatment:   38   mins   Total Treatment:     38    dimitrios Arechiga [facility]  Physical Therapist Assistant License #[id number]

## 2025-07-31 ENCOUNTER — TREATMENT (OUTPATIENT)
Dept: PHYSICAL THERAPY | Facility: CLINIC | Age: 70
End: 2025-07-31
Payer: MEDICARE

## 2025-07-31 DIAGNOSIS — M23.51 RECURRENT RIGHT KNEE INSTABILITY: ICD-10-CM

## 2025-07-31 DIAGNOSIS — M25.561 ACUTE PAIN OF RIGHT KNEE: Primary | ICD-10-CM

## 2025-07-31 DIAGNOSIS — M17.11 PRIMARY OSTEOARTHRITIS OF RIGHT KNEE: ICD-10-CM

## 2025-07-31 PROCEDURE — 97112 NEUROMUSCULAR REEDUCATION: CPT | Performed by: PHYSICAL THERAPIST

## 2025-07-31 PROCEDURE — 97110 THERAPEUTIC EXERCISES: CPT | Performed by: PHYSICAL THERAPIST

## 2025-07-31 NOTE — PROGRESS NOTES
Physical Therapy Daily Treatment Note      Patient: Santos Smith   : 1955  Diagnosis/ICD-10 Code:  Acute pain of right knee [M25.561]  Referring practitioner: DURGA Martinez  Date of Initial Visit: Type: THERAPY  Noted: 7/10/2025  Today's Date: 2025  Patient seen for 7 sessions         Santos Smith reports: he is doing well today his knee is somewhat sore particularly at distal quad just superior to the patella he states it has really been working hard in that area when he does the elevated split squats.    Objective   See Exercise, Manual, and Modality Logs for complete treatment.     Assessment/Plan  Pt. Tolerates treatment well intermittent soreness in distal quad but nothing limiting him from completing his exercises today. Pt. Shows good control and balance this date Pt. Was encouraged to continue strengthening over the next few weeks to zeferino In on quad strength and knee stability.    Goals  Plan Goals: STG to be met in 6 wks:   1. Pt will demonstrate at least 135 deg R knee flexion actively to get in/out of car with ease.   2. Pt will demonstrate R knee jt line girth no greater than 39.5cm.     LTG to be met in 12 wks:   1. Pt will demonstrate reciprocal stair descent without UE support for negotiating home.  2. Pt will demonstrate at least 60 deg R & L knee flexion w/0 deg hip flexion (modified arnel test).   3. Pt will demonstrate at least 17 STS (30s STS test) with no R knee pain for rising from restaurant chairs without arm rests.     Progress strengthening /stabilization /functional activity       Timed:         Manual Therapy:         mins  81940;     Therapeutic Exercise:    15     mins  83929;     Neuromuscular Arnold:    15    mins  05665;    Therapeutic Activity:          mins  25317;     Gait Training:           mins  71252;     Ultrasound:          mins  61692;      Un-Timed:  Electrical Stimulation:         mins  37413 ( );  Dry Needling          mins self-pay  [id number], 84443;  Traction          mins 99125;      Timed Treatment:   30   mins   Total Treatment:     30   mins    Michaela Arechiga PTA  Physical Therapist Assistant License #[id number]

## 2025-08-06 ENCOUNTER — TREATMENT (OUTPATIENT)
Dept: PHYSICAL THERAPY | Facility: CLINIC | Age: 70
End: 2025-08-06
Payer: MEDICARE

## 2025-08-06 DIAGNOSIS — M23.51 RECURRENT RIGHT KNEE INSTABILITY: ICD-10-CM

## 2025-08-06 DIAGNOSIS — M25.561 ACUTE PAIN OF RIGHT KNEE: Primary | ICD-10-CM

## 2025-08-06 DIAGNOSIS — M17.11 PRIMARY OSTEOARTHRITIS OF RIGHT KNEE: ICD-10-CM

## 2025-08-06 PROCEDURE — 97112 NEUROMUSCULAR REEDUCATION: CPT | Performed by: PHYSICAL THERAPIST

## 2025-08-06 PROCEDURE — 97110 THERAPEUTIC EXERCISES: CPT | Performed by: PHYSICAL THERAPIST

## 2025-08-11 ENCOUNTER — TREATMENT (OUTPATIENT)
Dept: PHYSICAL THERAPY | Facility: CLINIC | Age: 70
End: 2025-08-11
Payer: MEDICARE

## 2025-08-11 DIAGNOSIS — M17.11 PRIMARY OSTEOARTHRITIS OF RIGHT KNEE: ICD-10-CM

## 2025-08-11 DIAGNOSIS — M25.561 ACUTE PAIN OF RIGHT KNEE: Primary | ICD-10-CM

## 2025-08-11 DIAGNOSIS — M23.51 RECURRENT RIGHT KNEE INSTABILITY: ICD-10-CM

## 2025-08-11 PROCEDURE — 97112 NEUROMUSCULAR REEDUCATION: CPT | Performed by: PHYSICAL THERAPIST

## 2025-08-11 PROCEDURE — 97110 THERAPEUTIC EXERCISES: CPT | Performed by: PHYSICAL THERAPIST

## 2025-08-13 ENCOUNTER — TREATMENT (OUTPATIENT)
Dept: PHYSICAL THERAPY | Facility: CLINIC | Age: 70
End: 2025-08-13
Payer: MEDICARE

## 2025-08-13 DIAGNOSIS — M25.561 ACUTE PAIN OF RIGHT KNEE: Primary | ICD-10-CM

## 2025-08-13 DIAGNOSIS — M23.51 RECURRENT RIGHT KNEE INSTABILITY: ICD-10-CM

## 2025-08-13 DIAGNOSIS — M17.11 PRIMARY OSTEOARTHRITIS OF RIGHT KNEE: ICD-10-CM

## 2025-08-13 PROCEDURE — 97112 NEUROMUSCULAR REEDUCATION: CPT | Performed by: PHYSICAL THERAPIST

## 2025-08-13 PROCEDURE — 97530 THERAPEUTIC ACTIVITIES: CPT | Performed by: PHYSICAL THERAPIST
